# Patient Record
Sex: FEMALE | Race: WHITE | NOT HISPANIC OR LATINO
[De-identification: names, ages, dates, MRNs, and addresses within clinical notes are randomized per-mention and may not be internally consistent; named-entity substitution may affect disease eponyms.]

---

## 2018-02-23 ENCOUNTER — APPOINTMENT (OUTPATIENT)
Dept: RHEUMATOLOGY | Facility: CLINIC | Age: 63
End: 2018-02-23
Payer: COMMERCIAL

## 2018-02-23 VITALS
HEIGHT: 61 IN | DIASTOLIC BLOOD PRESSURE: 68 MMHG | OXYGEN SATURATION: 98 % | WEIGHT: 144 LBS | BODY MASS INDEX: 27.19 KG/M2 | SYSTOLIC BLOOD PRESSURE: 109 MMHG | HEART RATE: 64 BPM

## 2018-02-23 DIAGNOSIS — M35.9 SYSTEMIC INVOLVEMENT OF CONNECTIVE TISSUE, UNSPECIFIED: ICD-10-CM

## 2018-02-23 DIAGNOSIS — M32.9 SYSTEMIC LUPUS ERYTHEMATOSUS, UNSPECIFIED: ICD-10-CM

## 2018-02-23 DIAGNOSIS — R76.8 OTHER SPECIFIED ABNORMAL IMMUNOLOGICAL FINDINGS IN SERUM: ICD-10-CM

## 2018-02-23 PROCEDURE — 99204 OFFICE O/P NEW MOD 45 MIN: CPT

## 2018-02-23 RX ORDER — ASPIRIN ENTERIC COATED TABLETS 81 MG 81 MG/1
81 TABLET, DELAYED RELEASE ORAL
Refills: 0 | Status: ACTIVE | COMMUNITY

## 2018-02-23 RX ORDER — OMEPRAZOLE MAGNESIUM 20 MG/1
20 TABLET, DELAYED RELEASE ORAL
Refills: 0 | Status: ACTIVE | COMMUNITY

## 2018-02-23 RX ORDER — DILTIAZEM HYDROCHLORIDE 180 MG/1
180 TABLET, EXTENDED RELEASE ORAL
Refills: 0 | Status: ACTIVE | COMMUNITY

## 2018-02-23 RX ORDER — METOPROLOL TARTRATE 50 MG/1
50 TABLET ORAL
Refills: 0 | Status: ACTIVE | COMMUNITY

## 2018-02-28 ENCOUNTER — LABORATORY RESULT (OUTPATIENT)
Age: 63
End: 2018-02-28

## 2018-03-05 ENCOUNTER — RX RENEWAL (OUTPATIENT)
Age: 63
End: 2018-03-05

## 2018-03-05 LAB
ALBUMIN MFR SERPL ELPH: 56.2 %
ALBUMIN SERPL ELPH-MCNC: 4.1 G/DL
ALBUMIN SERPL-MCNC: 4 G/DL
ALBUMIN/GLOB SERPL: 1.2 RATIO
ALP BLD-CCNC: 91 U/L
ALPHA1 GLOB MFR SERPL ELPH: 4 %
ALPHA1 GLOB SERPL ELPH-MCNC: 0.3 G/DL
ALPHA2 GLOB MFR SERPL ELPH: 9.2 %
ALPHA2 GLOB SERPL ELPH-MCNC: 0.7 G/DL
ALT SERPL-CCNC: 17 U/L
ANA PAT FLD IF-IMP: ABNORMAL
ANA SER IF-ACNC: ABNORMAL
ANION GAP SERPL CALC-SCNC: 15 MMOL/L
APPEARANCE: CLEAR
AST SERPL-CCNC: 18 U/L
B-GLOBULIN MFR SERPL ELPH: 10.4 %
B-GLOBULIN SERPL ELPH-MCNC: 0.7 G/DL
B2 GLYCOPROT1 IGG SER-ACNC: >150 SGU
B2 GLYCOPROT1 IGM SER-ACNC: >150 SMU
BACTERIA: NEGATIVE
BASOPHILS # BLD AUTO: 0.02 K/UL
BASOPHILS NFR BLD AUTO: 0.5 %
BILIRUB SERPL-MCNC: 0.5 MG/DL
BILIRUBIN URINE: NEGATIVE
BLOOD URINE: NEGATIVE
BUN SERPL-MCNC: 20 MG/DL
CALCIUM SERPL-MCNC: 9.7 MG/DL
CARDIOLIPIN IGM SER-MCNC: 10.9 MPL
CARDIOLIPIN IGM SER-MCNC: <5 GPL
CCP AB SER IA-ACNC: <8 UNITS
CHLORIDE SERPL-SCNC: 105 MMOL/L
CO2 SERPL-SCNC: 23 MMOL/L
COLOR: YELLOW
CREAT SERPL-MCNC: 0.84 MG/DL
CREAT SPEC-SCNC: 101 MG/DL
CREAT/PROT UR: 0.1 RATIO
CRP SERPL-MCNC: <0.2 MG/DL
DSDNA AB SER-ACNC: <12 IU/ML
ENA JO1 AB SER IA-ACNC: <0.2 AL
ENA RNP AB SER IA-ACNC: <0.2 AL
ENA SM AB SER IA-ACNC: <0.2 AL
ENA SS-A AB SER IA-ACNC: 3.8 AL
ENA SS-B AB SER IA-ACNC: <0.2 AL
EOSINOPHIL # BLD AUTO: 0.06 K/UL
EOSINOPHIL NFR BLD AUTO: 1.5 %
ERYTHROCYTE [SEDIMENTATION RATE] IN BLOOD BY WESTERGREN METHOD: 19 MM/HR
GAMMA GLOB FLD ELPH-MCNC: 1.5 G/DL
GAMMA GLOB MFR SERPL ELPH: 20.2 %
GLUCOSE QUALITATIVE U: NEGATIVE MG/DL
GLUCOSE SERPL-MCNC: 93 MG/DL
HCT VFR BLD CALC: 37.2 %
HGB BLD-MCNC: 11.9 G/DL
HYALINE CASTS: 2 /LPF
IMM GRANULOCYTES NFR BLD AUTO: 0.2 %
INTERPRETATION SERPL IEP-IMP: NORMAL
KETONES URINE: NEGATIVE
LEUKOCYTE ESTERASE URINE: NEGATIVE
LYMPHOCYTES # BLD AUTO: 1.26 K/UL
LYMPHOCYTES NFR BLD AUTO: 30.5 %
MAN DIFF?: NORMAL
MCHC RBC-ENTMCNC: 30.6 PG
MCHC RBC-ENTMCNC: 32 GM/DL
MCV RBC AUTO: 95.6 FL
MICROSCOPIC-UA: NORMAL
MONOCYTES # BLD AUTO: 0.71 K/UL
MONOCYTES NFR BLD AUTO: 17.2 %
NEUTROPHILS # BLD AUTO: 2.07 K/UL
NEUTROPHILS NFR BLD AUTO: 50.1 %
NITRITE URINE: NEGATIVE
PH URINE: 6
PLATELET # BLD AUTO: 236 K/UL
POTASSIUM SERPL-SCNC: 4.4 MMOL/L
PROT SERPL-MCNC: 7.2 G/DL
PROT SERPL-MCNC: 7.2 G/DL
PROT SERPL-MCNC: 7.3 G/DL
PROT UR-MCNC: 7 MG/DL
PROTEIN URINE: NEGATIVE MG/DL
RBC # BLD: 3.89 M/UL
RBC # FLD: 13.3 %
RED BLOOD CELLS URINE: 1 /HPF
RF+CCP IGG SER-IMP: NEGATIVE
RHEUMATOID FACT SER QL: <7 IU/ML
SODIUM SERPL-SCNC: 143 MMOL/L
SPECIFIC GRAVITY URINE: 1.02
SQUAMOUS EPITHELIAL CELLS: 0 /HPF
THYROGLOB AB SERPL-ACNC: <20 IU/ML
THYROPEROXIDASE AB SERPL IA-ACNC: 22.6 IU/ML
UROBILINOGEN URINE: NEGATIVE MG/DL
WBC # FLD AUTO: 4.13 K/UL
WHITE BLOOD CELLS URINE: 1 /HPF

## 2018-03-29 LAB — HISTONE AB SER QL: 0.8

## 2018-05-17 ENCOUNTER — APPOINTMENT (OUTPATIENT)
Dept: CT IMAGING | Facility: CLINIC | Age: 63
End: 2018-05-17
Payer: COMMERCIAL

## 2018-05-17 ENCOUNTER — OUTPATIENT (OUTPATIENT)
Dept: OUTPATIENT SERVICES | Facility: HOSPITAL | Age: 63
LOS: 1 days | End: 2018-05-17
Payer: COMMERCIAL

## 2018-05-17 DIAGNOSIS — R06.02 SHORTNESS OF BREATH: ICD-10-CM

## 2018-05-17 PROCEDURE — 71250 CT THORAX DX C-: CPT

## 2018-05-17 PROCEDURE — 71250 CT THORAX DX C-: CPT | Mod: 26

## 2018-05-30 ENCOUNTER — APPOINTMENT (OUTPATIENT)
Dept: RHEUMATOLOGY | Facility: CLINIC | Age: 63
End: 2018-05-30

## 2018-09-05 ENCOUNTER — FORM ENCOUNTER (OUTPATIENT)
Age: 63
End: 2018-09-05

## 2018-09-06 ENCOUNTER — APPOINTMENT (OUTPATIENT)
Dept: RADIOLOGY | Facility: CLINIC | Age: 63
End: 2018-09-06
Payer: COMMERCIAL

## 2018-09-06 ENCOUNTER — OUTPATIENT (OUTPATIENT)
Dept: OUTPATIENT SERVICES | Facility: HOSPITAL | Age: 63
LOS: 1 days | End: 2018-09-06
Payer: COMMERCIAL

## 2018-09-06 DIAGNOSIS — Z00.8 ENCOUNTER FOR OTHER GENERAL EXAMINATION: ICD-10-CM

## 2018-09-06 PROCEDURE — 71100 X-RAY EXAM RIBS UNI 2 VIEWS: CPT

## 2018-09-06 PROCEDURE — 71100 X-RAY EXAM RIBS UNI 2 VIEWS: CPT | Mod: 26,RT

## 2018-10-02 ENCOUNTER — APPOINTMENT (OUTPATIENT)
Dept: BREAST CENTER | Facility: CLINIC | Age: 63
End: 2018-10-02
Payer: COMMERCIAL

## 2018-10-02 VITALS
DIASTOLIC BLOOD PRESSURE: 72 MMHG | WEIGHT: 146 LBS | HEIGHT: 61 IN | BODY MASS INDEX: 27.56 KG/M2 | SYSTOLIC BLOOD PRESSURE: 124 MMHG

## 2018-10-02 DIAGNOSIS — Z82.3 FAMILY HISTORY OF STROKE: ICD-10-CM

## 2018-10-02 DIAGNOSIS — Z87.39 PERSONAL HISTORY OF OTHER DISEASES OF THE MUSCULOSKELETAL SYSTEM AND CONNECTIVE TISSUE: ICD-10-CM

## 2018-10-02 DIAGNOSIS — Z82.49 FAMILY HISTORY OF ISCHEMIC HEART DISEASE AND OTHER DISEASES OF THE CIRCULATORY SYSTEM: ICD-10-CM

## 2018-10-02 DIAGNOSIS — Z80.0 FAMILY HISTORY OF MALIGNANT NEOPLASM OF DIGESTIVE ORGANS: ICD-10-CM

## 2018-10-02 DIAGNOSIS — Z87.891 PERSONAL HISTORY OF NICOTINE DEPENDENCE: ICD-10-CM

## 2018-10-02 DIAGNOSIS — Z81.8 FAMILY HISTORY OF OTHER MENTAL AND BEHAVIORAL DISORDERS: ICD-10-CM

## 2018-10-02 DIAGNOSIS — Z84.0 FAMILY HISTORY OF DISEASES OF THE SKIN AND SUBCUTANEOUS TISSUE: ICD-10-CM

## 2018-10-02 DIAGNOSIS — N64.89 OTHER SPECIFIED DISORDERS OF BREAST: ICD-10-CM

## 2018-10-02 DIAGNOSIS — R92.8 OTHER ABNORMAL AND INCONCLUSIVE FINDINGS ON DIAGNOSTIC IMAGING OF BREAST: ICD-10-CM

## 2018-10-02 PROCEDURE — 99243 OFF/OP CNSLTJ NEW/EST LOW 30: CPT

## 2018-12-24 PROBLEM — M32.9 LUPUS: Status: ACTIVE | Noted: 2018-02-23

## 2019-03-05 ENCOUNTER — APPOINTMENT (OUTPATIENT)
Dept: RADIOLOGY | Facility: CLINIC | Age: 64
End: 2019-03-05
Payer: COMMERCIAL

## 2019-03-05 ENCOUNTER — OUTPATIENT (OUTPATIENT)
Dept: OUTPATIENT SERVICES | Facility: HOSPITAL | Age: 64
LOS: 1 days | End: 2019-03-05
Payer: COMMERCIAL

## 2019-03-05 DIAGNOSIS — Z00.8 ENCOUNTER FOR OTHER GENERAL EXAMINATION: ICD-10-CM

## 2019-03-05 PROCEDURE — 77080 DXA BONE DENSITY AXIAL: CPT

## 2019-03-05 PROCEDURE — 77080 DXA BONE DENSITY AXIAL: CPT | Mod: 26

## 2020-03-11 ENCOUNTER — APPOINTMENT (OUTPATIENT)
Dept: ELECTROPHYSIOLOGY | Facility: CLINIC | Age: 65
End: 2020-03-11

## 2020-04-25 ENCOUNTER — MESSAGE (OUTPATIENT)
Age: 65
End: 2020-04-25

## 2020-05-05 ENCOUNTER — APPOINTMENT (OUTPATIENT)
Dept: DISASTER EMERGENCY | Facility: CLINIC | Age: 65
End: 2020-05-05

## 2020-05-06 LAB
SARS-COV-2 IGG SERPL IA-ACNC: <0.1 INDEX
SARS-COV-2 IGG SERPL QL IA: NEGATIVE

## 2020-05-18 ENCOUNTER — RESULT REVIEW (OUTPATIENT)
Age: 65
End: 2020-05-18

## 2020-06-03 RX ORDER — CYCLOBENZAPRINE HYDROCHLORIDE 10 MG/1
10 TABLET, FILM COATED ORAL
Refills: 0 | Status: DISCONTINUED | COMMUNITY
End: 2020-06-03

## 2020-06-03 RX ORDER — SERTRALINE HYDROCHLORIDE 100 MG/1
100 TABLET, FILM COATED ORAL
Refills: 0 | Status: DISCONTINUED | COMMUNITY
End: 2020-06-03

## 2020-06-08 ENCOUNTER — APPOINTMENT (OUTPATIENT)
Dept: SURGICAL ONCOLOGY | Facility: CLINIC | Age: 65
End: 2020-06-08
Payer: COMMERCIAL

## 2020-06-08 VITALS
WEIGHT: 138 LBS | SYSTOLIC BLOOD PRESSURE: 149 MMHG | BODY MASS INDEX: 26.06 KG/M2 | RESPIRATION RATE: 15 BRPM | HEIGHT: 61 IN | OXYGEN SATURATION: 100 % | TEMPERATURE: 98.1 F | HEART RATE: 56 BPM | DIASTOLIC BLOOD PRESSURE: 76 MMHG

## 2020-06-08 PROCEDURE — 99204 OFFICE O/P NEW MOD 45 MIN: CPT

## 2020-06-10 ENCOUNTER — OUTPATIENT (OUTPATIENT)
Dept: OUTPATIENT SERVICES | Facility: HOSPITAL | Age: 65
LOS: 1 days | End: 2020-06-10
Payer: COMMERCIAL

## 2020-06-10 ENCOUNTER — APPOINTMENT (OUTPATIENT)
Dept: RADIOLOGY | Facility: CLINIC | Age: 65
End: 2020-06-10
Payer: COMMERCIAL

## 2020-06-10 DIAGNOSIS — D03.39 MELANOMA IN SITU OF OTHER PARTS OF FACE: ICD-10-CM

## 2020-06-10 PROCEDURE — 71046 X-RAY EXAM CHEST 2 VIEWS: CPT

## 2020-06-10 PROCEDURE — 71046 X-RAY EXAM CHEST 2 VIEWS: CPT | Mod: 26

## 2020-06-16 ENCOUNTER — RESULT REVIEW (OUTPATIENT)
Age: 65
End: 2020-06-16

## 2020-07-07 DIAGNOSIS — Z01.818 ENCOUNTER FOR OTHER PREPROCEDURAL EXAMINATION: ICD-10-CM

## 2020-07-09 ENCOUNTER — APPOINTMENT (OUTPATIENT)
Dept: PLASTIC SURGERY | Facility: CLINIC | Age: 65
End: 2020-07-09
Payer: COMMERCIAL

## 2020-07-09 PROCEDURE — 99204 OFFICE O/P NEW MOD 45 MIN: CPT

## 2020-07-09 NOTE — CONSULT LETTER
[Dear  ___] : Dear  [unfilled], [Consult Letter:] : I had the pleasure of evaluating your patient, [unfilled]. [Please see my note below.] : Please see my note below. [Consult Closing:] : Thank you very much for allowing me to participate in the care of this patient.  If you have any questions, please do not hesitate to contact me. [Sincerely,] : Sincerely, [FreeTextEntry3] : Arun Bunn MD

## 2020-07-17 RX ORDER — SODIUM CHLORIDE 9 MG/ML
3 INJECTION INTRAMUSCULAR; INTRAVENOUS; SUBCUTANEOUS EVERY 8 HOURS
Refills: 0 | Status: DISCONTINUED | OUTPATIENT
Start: 2020-07-21 | End: 2020-08-05

## 2020-07-17 RX ORDER — LIDOCAINE HCL 20 MG/ML
0.2 VIAL (ML) INJECTION ONCE
Refills: 0 | Status: DISCONTINUED | OUTPATIENT
Start: 2020-07-21 | End: 2020-08-05

## 2020-07-18 ENCOUNTER — APPOINTMENT (OUTPATIENT)
Dept: DISASTER EMERGENCY | Facility: CLINIC | Age: 65
End: 2020-07-18

## 2020-07-18 LAB — SARS-COV-2 N GENE NPH QL NAA+PROBE: NOT DETECTED

## 2020-07-20 ENCOUNTER — TRANSCRIPTION ENCOUNTER (OUTPATIENT)
Age: 65
End: 2020-07-20

## 2020-07-20 NOTE — ASU DISCHARGE PLAN (ADULT/PEDIATRIC) - FOLLOW UP APPOINTMENTS
[facility] ([facility]): 911 or go to the nearest Emergency Room

## 2020-07-20 NOTE — ASU DISCHARGE PLAN (ADULT/PEDIATRIC) - CARE PROVIDER_API CALL
Aurn Bunn (MD)  ColonRectal Surgery; Plastic Surgery; Surgery; Surgery of the Hand  [address] [place] [address]  Phone: [phone]  Fax: [phone]  Follow Up Time: 1 week

## 2020-07-20 NOTE — ASU DISCHARGE PLAN (ADULT/PEDIATRIC) - CALL YOUR DOCTOR IF YOU HAVE ANY OF THE FOLLOWING:
Inability to tolerate liquids or foods/Bleeding that does not stop/Swelling that gets worse/Wound/Surgical Site with redness, or foul smelling discharge or pus/Excessive diarrhea/Pain not relieved by Medications/Numbness, tingling, color or temperature change to extremity/Unable to urinate/Nausea and vomiting that does not stop/Increased irritability or sluggishness

## 2020-07-20 NOTE — ASU DISCHARGE PLAN (ADULT/PEDIATRIC) - ASU DC SPECIAL INSTRUCTIONSFT
Initial followup with plastic surgery in the next 5-15 days, regarding matters of bandages, activities, and showering.    Dr. Hernandez should call with pathology report in approximately 2 weeks.  The conversation will determine further management.

## 2020-07-21 ENCOUNTER — APPOINTMENT (OUTPATIENT)
Dept: SURGICAL ONCOLOGY | Facility: HOSPITAL | Age: 65
End: 2020-07-21

## 2020-07-21 ENCOUNTER — OUTPATIENT (OUTPATIENT)
Dept: OUTPATIENT SERVICES | Facility: HOSPITAL | Age: 65
LOS: 1 days | End: 2020-07-21
Payer: COMMERCIAL

## 2020-07-21 ENCOUNTER — RESULT REVIEW (OUTPATIENT)
Age: 65
End: 2020-07-21

## 2020-07-21 ENCOUNTER — APPOINTMENT (OUTPATIENT)
Dept: PLASTIC SURGERY | Facility: HOSPITAL | Age: 65
End: 2020-07-21

## 2020-07-21 VITALS
OXYGEN SATURATION: 98 % | HEART RATE: 61 BPM | TEMPERATURE: 97 F | HEIGHT: 61 IN | WEIGHT: 138.89 LBS | DIASTOLIC BLOOD PRESSURE: 78 MMHG | RESPIRATION RATE: 16 BRPM | SYSTOLIC BLOOD PRESSURE: 131 MMHG

## 2020-07-21 VITALS
HEART RATE: 65 BPM | TEMPERATURE: 97 F | RESPIRATION RATE: 18 BRPM | DIASTOLIC BLOOD PRESSURE: 61 MMHG | OXYGEN SATURATION: 99 % | SYSTOLIC BLOOD PRESSURE: 129 MMHG

## 2020-07-21 DIAGNOSIS — Z90.49 ACQUIRED ABSENCE OF OTHER SPECIFIED PARTS OF DIGESTIVE TRACT: Chronic | ICD-10-CM

## 2020-07-21 DIAGNOSIS — Z98.890 OTHER SPECIFIED POSTPROCEDURAL STATES: Chronic | ICD-10-CM

## 2020-07-21 DIAGNOSIS — D03.39 MELANOMA IN SITU OF OTHER PARTS OF FACE: ICD-10-CM

## 2020-07-21 PROBLEM — Z87.19 PERSONAL HISTORY OF OTHER DISEASES OF THE DIGESTIVE SYSTEM: Chronic | Status: ACTIVE | Noted: 2020-07-17

## 2020-07-21 PROBLEM — Z87.39 PERSONAL HISTORY OF OTHER DISEASES OF THE MUSCULOSKELETAL SYSTEM AND CONNECTIVE TISSUE: Chronic | Status: ACTIVE | Noted: 2020-07-17

## 2020-07-21 PROBLEM — I47.1 SUPRAVENTRICULAR TACHYCARDIA: Chronic | Status: ACTIVE | Noted: 2020-07-17

## 2020-07-21 PROBLEM — Z86.79 PERSONAL HISTORY OF OTHER DISEASES OF THE CIRCULATORY SYSTEM: Chronic | Status: ACTIVE | Noted: 2020-07-17

## 2020-07-21 PROBLEM — Z87.09 PERSONAL HISTORY OF OTHER DISEASES OF THE RESPIRATORY SYSTEM: Chronic | Status: ACTIVE | Noted: 2020-07-17

## 2020-07-21 PROBLEM — R76.8 OTHER SPECIFIED ABNORMAL IMMUNOLOGICAL FINDINGS IN SERUM: Chronic | Status: ACTIVE | Noted: 2020-07-17

## 2020-07-21 PROBLEM — M35.00 SJOGREN SYNDROME, UNSPECIFIED: Chronic | Status: ACTIVE | Noted: 2020-07-17

## 2020-07-21 PROBLEM — C30.0 MALIGNANT NEOPLASM OF NASAL CAVITY: Chronic | Status: ACTIVE | Noted: 2020-07-17

## 2020-07-21 PROBLEM — Z86.19 PERSONAL HISTORY OF OTHER INFECTIOUS AND PARASITIC DISEASES: Chronic | Status: ACTIVE | Noted: 2020-07-17

## 2020-07-21 LAB
ANION GAP SERPL CALC-SCNC: 10 MMOL/L — SIGNIFICANT CHANGE UP (ref 5–17)
BUN SERPL-MCNC: 20 MG/DL — SIGNIFICANT CHANGE UP (ref 7–23)
CALCIUM SERPL-MCNC: 9.8 MG/DL — SIGNIFICANT CHANGE UP (ref 8.4–10.5)
CHLORIDE SERPL-SCNC: 105 MMOL/L — SIGNIFICANT CHANGE UP (ref 96–108)
CO2 SERPL-SCNC: 25 MMOL/L — SIGNIFICANT CHANGE UP (ref 22–31)
CREAT SERPL-MCNC: 0.68 MG/DL — SIGNIFICANT CHANGE UP (ref 0.5–1.3)
GLUCOSE SERPL-MCNC: 98 MG/DL — SIGNIFICANT CHANGE UP (ref 70–99)
HCT VFR BLD CALC: 37.3 % — SIGNIFICANT CHANGE UP (ref 34.5–45)
HGB BLD-MCNC: 12.2 G/DL — SIGNIFICANT CHANGE UP (ref 11.5–15.5)
LDH SERPL L TO P-CCNC: 150 U/L — SIGNIFICANT CHANGE UP (ref 50–242)
MCHC RBC-ENTMCNC: 30.7 PG — SIGNIFICANT CHANGE UP (ref 27–34)
MCHC RBC-ENTMCNC: 32.7 GM/DL — SIGNIFICANT CHANGE UP (ref 32–36)
MCV RBC AUTO: 94 FL — SIGNIFICANT CHANGE UP (ref 80–100)
NRBC # BLD: 0 /100 WBCS — SIGNIFICANT CHANGE UP (ref 0–0)
PLATELET # BLD AUTO: 215 K/UL — SIGNIFICANT CHANGE UP (ref 150–400)
POTASSIUM SERPL-MCNC: 3.8 MMOL/L — SIGNIFICANT CHANGE UP (ref 3.5–5.3)
POTASSIUM SERPL-SCNC: 3.8 MMOL/L — SIGNIFICANT CHANGE UP (ref 3.5–5.3)
RBC # BLD: 3.97 M/UL — SIGNIFICANT CHANGE UP (ref 3.8–5.2)
RBC # FLD: 12.7 % — SIGNIFICANT CHANGE UP (ref 10.3–14.5)
SODIUM SERPL-SCNC: 140 MMOL/L — SIGNIFICANT CHANGE UP (ref 135–145)
WBC # BLD: 2.93 K/UL — LOW (ref 3.8–10.5)
WBC # FLD AUTO: 2.93 K/UL — LOW (ref 3.8–10.5)

## 2020-07-21 PROCEDURE — 83615 LACTATE (LD) (LDH) ENZYME: CPT

## 2020-07-21 PROCEDURE — 15260 FTH/GFT FR N/E/E/L 20 SQCM/<: CPT

## 2020-07-21 PROCEDURE — 80048 BASIC METABOLIC PNL TOTAL CA: CPT

## 2020-07-21 PROCEDURE — 88305 TISSUE EXAM BY PATHOLOGIST: CPT | Mod: 26

## 2020-07-21 PROCEDURE — 11643 EXC F/E/E/N/L MAL+MRG 2.1-3: CPT

## 2020-07-21 PROCEDURE — 14061 TIS TRNFR E/N/E/L10.1-30SQCM: CPT

## 2020-07-21 PROCEDURE — 85027 COMPLETE CBC AUTOMATED: CPT

## 2020-07-21 PROCEDURE — 88305 TISSUE EXAM BY PATHOLOGIST: CPT

## 2020-07-21 PROCEDURE — 15750 NEUROVASCULAR PEDICLE FLAP: CPT

## 2020-07-21 RX ORDER — ONDANSETRON 8 MG/1
4 TABLET, FILM COATED ORAL ONCE
Refills: 0 | Status: DISCONTINUED | OUTPATIENT
Start: 2020-07-21 | End: 2020-08-05

## 2020-07-21 RX ORDER — ACETAMINOPHEN 500 MG
1000 TABLET ORAL ONCE
Refills: 0 | Status: DISCONTINUED | OUTPATIENT
Start: 2020-07-21 | End: 2020-08-05

## 2020-07-21 RX ORDER — OXYCODONE HYDROCHLORIDE 5 MG/1
1 TABLET ORAL
Qty: 8 | Refills: 0
Start: 2020-07-21 | End: 2020-07-25

## 2020-07-21 NOTE — BRIEF OPERATIVE NOTE - NSICDXBRIEFPROCEDURE_GEN_ALL_CORE_FT
PROCEDURES:  Full-thickness skin graft to nose 21-Jul-2020 10:27:51  Abbe Beyer  Reconstruction, nose, using nasolabial flap 21-Jul-2020 10:27:40  Abbe Beyer

## 2020-07-21 NOTE — BRIEF OPERATIVE NOTE - OPERATION/FINDINGS
L nasolabial flap for repair of L ala and skin graft to superior defect from distal tip of the nasolabial flap. Bolster applied.

## 2020-07-21 NOTE — PRE-ANESTHESIA EVALUATION ADULT - NSANTHOSAYNRD_GEN_A_CORE
No. DANNIELLE screening performed.  STOP BANG Legend: 0-2 = LOW Risk; 3-4 = INTERMEDIATE Risk; 5-8 = HIGH Risk

## 2020-07-21 NOTE — ASU PATIENT PROFILE, ADULT - PMH
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

## 2020-07-21 NOTE — ASU PATIENT PROFILE, ADULT - ABILITY TO HEAR (WITH HEARING AID OR HEARING APPLIANCE IF NORMALLY USED):
Adequate: hears normal conversation without difficulty Mildly to Moderately Impaired: difficulty hearing in some environments or speaker may need to increase volume or speak distinctly/lip reads

## 2020-07-21 NOTE — PRE-ANESTHESIA EVALUATION ADULT - NSANTHINDVINFOSD_GEN_ALL_CORE
patient O-Z Plasty Text: The defect edges were debeveled with a #15 scalpel blade.  Given the location of the defect, shape of the defect and the proximity to free margins an O-Z plasty (double transposition flap) was deemed most appropriate.  Using a sterile surgical marker, the appropriate transposition flaps were drawn incorporating the defect and placing the expected incisions within the relaxed skin tension lines where possible.    The area thus outlined was incised deep to adipose tissue with a #15 scalpel blade.  The skin margins were undermined to an appropriate distance in all directions utilizing iris scissors.  Hemostasis was achieved with electrocautery.  The flaps were then transposed into place, one clockwise and the other counterclockwise, and anchored with interrupted buried subcutaneous sutures.

## 2020-07-21 NOTE — ASU PATIENT PROFILE, ADULT - PSH
History of biopsy  muscle  History of cardiac radiofrequency ablation  SVT 10 yr ago  History of cholecystectomy    History of loop recorder    History of melanoma excision  chest  14 yr ago

## 2020-07-28 ENCOUNTER — APPOINTMENT (OUTPATIENT)
Dept: PLASTIC SURGERY | Facility: CLINIC | Age: 65
End: 2020-07-28
Payer: COMMERCIAL

## 2020-07-28 VITALS
OXYGEN SATURATION: 100 % | TEMPERATURE: 98.4 F | HEIGHT: 61 IN | SYSTOLIC BLOOD PRESSURE: 128 MMHG | WEIGHT: 139 LBS | HEART RATE: 65 BPM | BODY MASS INDEX: 26.24 KG/M2 | DIASTOLIC BLOOD PRESSURE: 76 MMHG

## 2020-07-28 PROCEDURE — 99024 POSTOP FOLLOW-UP VISIT: CPT

## 2020-07-29 LAB — SURGICAL PATHOLOGY STUDY: SIGNIFICANT CHANGE UP

## 2020-07-31 ENCOUNTER — TRANSCRIPTION ENCOUNTER (OUTPATIENT)
Age: 65
End: 2020-07-31

## 2020-07-31 NOTE — REASON FOR VISIT
[Initial Consultation] : an initial consultation for [Other: _____] : [unfilled] [FreeTextEntry2] :  Left nose  Melanoma in situ- shave biopsy

## 2020-07-31 NOTE — ASSESSMENT
[FreeTextEntry1] : We had a discussion regarding the recently diagnosed melanoma in situ of the left side of the nose.\par \par I explained the indications and technique for appropriate resection, which will be coordinated with plastic surgery.\par Using a photograph it demonstrated the scope of the lesion, and he anticipated resection to incorporate the pigmentation, at the same time taking into consideration issues with form/function\par She agrees, paperwork submitted\par \par She should have a preoperative chest x-ray.\par She agrees, paperwork submitted\par \par Reviewed in detail, all questions answered.\par \par Note dictated\par \par \par 06-10-20.\par Preoperative chest x-ray:\par My lung hyperextension, and presence of loop recorder noted.\par No other findings.\par \par \par 07-31-20.\par I called her but had to leave a voicemail.\par July 21, 2020, she had wide excision of a large diameter melanoma in situ from the left side of her nose, with reconstruction by Dr. Arun Bunn.\par Final pathology indicates no invasive component, residual in situ melanoma, and negative margins of resection.\par No further intervention presently warranted.\par Note dictated to the referring physicians.\par My office will call to schedule a follow-up visit.

## 2020-07-31 NOTE — PHYSICAL EXAM
[Normal] : supple, no neck mass and thyroid not enlarged [Normal Neck Lymph Nodes] : normal neck lymph nodes  [Normal Supraclavicular Lymph Nodes] : normal supraclavicular lymph nodes [Normal Axillary Lymph Nodes] : normal axillary lymph nodes [Normal] : full range of motion and no deformities appreciated [de-identified] : groins not examined [de-identified] : Below

## 2020-07-31 NOTE — HISTORY OF PRESENT ILLNESS
[de-identified] : 65-year-old lady referred by her dermatologist (Dr. Baylee TOPETE) with a melanoma in situ on the LEFT-SIDE oh her NOSE.\par \par This was an asymptomatic pigmented lesion that she was aware of since ~January 2020.\par Because the attention of her dermatologist (above) on a recent examination.\par \par Concomitant biopsies from the left thigh and mid back were seborrheic keratosis, and a compound lentiginous melanocytic nevus, respectively.\par \par + Personal history:\par ~2007 she had wide excision of a 0.3 mm melanoma by JR.\par No other personal history of skin cancer.\par \par No other personal history of malignancy.\par \par \par No relatives with melanoma.\par \par Her father had colon cancer.\par Mother had lupus.\par \par No other relatives with history of malignancy\par \par \par PMD:\par Dr. Winston FOWLER\par \par Cardiology: Dr. Ankush VASQUEZ.\par + SVT.\par + LOOP RECORDER, battery change pending\par \par PMH/Current medications:\par \par Atrial fibrillation.\par Baby aspirin daily.\par \par Blood pressure is controlled with Lopressor and Cardizem.\par \par + Chronic pulmonary problems.\par She sees Dr. Andrew WEIL intermittently.\par \par History of gastritis\par She takes Prilosec OTC.\par \par + Fibromyalgia\par + Lyme disease\par Rheumatology: Dr. Vijaya CADET\par \par She was started on Zoloft by Dr. Toño Garay.\par This was when she developed the above chronic medical problems.\par The prescription is now renewed by her internist.\par \par Most recent eye examination: 2018.\par She does not recall the physician's name.\par I advised a followup visit.\par \par She has not seen a gynecologist since 2015.\par She does not recall the name of the physician.\par I suggested a followup visit.\par \par She has breast examinations with Dr. Theresa Santana.\par Last imaging was 2018, she is aware that she is (over)due\par \par Colonoscopy:\par + FH: Father had colon cancer.\par Goes to Dr. Theodore Perlman.\par 2019 evaluation was unremarkable.

## 2020-07-31 NOTE — REVIEW OF SYSTEMS
[Negative] : Endocrine [FreeTextEntry5] : atrial fibrillation [FreeTextEntry7] : Lyme disease [de-identified] : Fibromyalgia [de-identified] : Melanoma

## 2020-08-04 ENCOUNTER — APPOINTMENT (OUTPATIENT)
Dept: PLASTIC SURGERY | Facility: CLINIC | Age: 65
End: 2020-08-04
Payer: COMMERCIAL

## 2020-08-04 VITALS
HEIGHT: 61 IN | SYSTOLIC BLOOD PRESSURE: 121 MMHG | BODY MASS INDEX: 26.24 KG/M2 | OXYGEN SATURATION: 99 % | WEIGHT: 139 LBS | TEMPERATURE: 97.6 F | HEART RATE: 55 BPM | DIASTOLIC BLOOD PRESSURE: 74 MMHG

## 2020-08-04 PROCEDURE — 99024 POSTOP FOLLOW-UP VISIT: CPT

## 2020-08-07 ENCOUNTER — APPOINTMENT (OUTPATIENT)
Dept: PLASTIC SURGERY | Facility: CLINIC | Age: 65
End: 2020-08-07
Payer: COMMERCIAL

## 2020-08-07 PROCEDURE — 99024 POSTOP FOLLOW-UP VISIT: CPT

## 2020-08-14 ENCOUNTER — APPOINTMENT (OUTPATIENT)
Dept: DISASTER EMERGENCY | Facility: CLINIC | Age: 65
End: 2020-08-14

## 2020-08-14 ENCOUNTER — LABORATORY RESULT (OUTPATIENT)
Age: 65
End: 2020-08-14

## 2020-08-14 DIAGNOSIS — Z11.59 ENCOUNTER FOR SCREENING FOR OTHER VIRAL DISEASES: ICD-10-CM

## 2020-08-17 ENCOUNTER — LABORATORY RESULT (OUTPATIENT)
Age: 65
End: 2020-08-17

## 2020-08-19 ENCOUNTER — TRANSCRIPTION ENCOUNTER (OUTPATIENT)
Age: 65
End: 2020-08-19

## 2020-08-20 ENCOUNTER — APPOINTMENT (OUTPATIENT)
Dept: PLASTIC SURGERY | Facility: HOSPITAL | Age: 65
End: 2020-08-20

## 2020-08-20 ENCOUNTER — OUTPATIENT (OUTPATIENT)
Dept: OUTPATIENT SERVICES | Facility: HOSPITAL | Age: 65
LOS: 1 days | Discharge: HOME | End: 2020-08-20
Payer: COMMERCIAL

## 2020-08-20 VITALS
HEART RATE: 58 BPM | HEIGHT: 61 IN | WEIGHT: 138.01 LBS | OXYGEN SATURATION: 100 % | TEMPERATURE: 98 F | RESPIRATION RATE: 16 BRPM | DIASTOLIC BLOOD PRESSURE: 65 MMHG | SYSTOLIC BLOOD PRESSURE: 118 MMHG

## 2020-08-20 VITALS
DIASTOLIC BLOOD PRESSURE: 59 MMHG | OXYGEN SATURATION: 99 % | RESPIRATION RATE: 15 BRPM | HEART RATE: 65 BPM | SYSTOLIC BLOOD PRESSURE: 125 MMHG

## 2020-08-20 DIAGNOSIS — Z98.890 OTHER SPECIFIED POSTPROCEDURAL STATES: Chronic | ICD-10-CM

## 2020-08-20 DIAGNOSIS — D03.39 MELANOMA IN SITU OF OTHER PARTS OF FACE: ICD-10-CM

## 2020-08-20 DIAGNOSIS — Z90.49 ACQUIRED ABSENCE OF OTHER SPECIFIED PARTS OF DIGESTIVE TRACT: Chronic | ICD-10-CM

## 2020-08-20 PROCEDURE — 15630 DELAY FLAP EYE/NOS/EAR/LIP: CPT | Mod: 58

## 2020-08-20 RX ORDER — ONDANSETRON 8 MG/1
4 TABLET, FILM COATED ORAL ONCE
Refills: 0 | Status: DISCONTINUED | OUTPATIENT
Start: 2020-08-20 | End: 2020-09-04

## 2020-08-20 RX ORDER — ACETAMINOPHEN 500 MG
650 TABLET ORAL ONCE
Refills: 0 | Status: DISCONTINUED | OUTPATIENT
Start: 2020-08-20 | End: 2020-09-04

## 2020-08-20 RX ORDER — FENTANYL CITRATE 50 UG/ML
50 INJECTION INTRAVENOUS
Refills: 0 | Status: DISCONTINUED | OUTPATIENT
Start: 2020-08-20 | End: 2020-08-20

## 2020-08-20 RX ORDER — SODIUM CHLORIDE 9 MG/ML
1000 INJECTION, SOLUTION INTRAVENOUS
Refills: 0 | Status: DISCONTINUED | OUTPATIENT
Start: 2020-08-20 | End: 2020-09-04

## 2020-08-20 RX ORDER — FENTANYL CITRATE 50 UG/ML
25 INJECTION INTRAVENOUS
Refills: 0 | Status: DISCONTINUED | OUTPATIENT
Start: 2020-08-20 | End: 2020-08-20

## 2020-08-20 NOTE — ASU DISCHARGE PLAN (ADULT/PEDIATRIC) - ACTIVITY LEVEL
No tub baths/No excercise/No sports/gym/No heavy lifting/keep the head elevated and avoid bending down. avoid sleeping flat on your back make sure to use 1-2 pillows to keep elevated. this will reduce the swelling

## 2020-08-20 NOTE — BRIEF OPERATIVE NOTE - NSICDXBRIEFPREOP_GEN_ALL_CORE_FT
PRE-OP DIAGNOSIS:  Melanoma of nose 20-Aug-2020 16:04:40  Galen Anne  History of melanoma 20-Aug-2020 16:04:27  Galen Anne

## 2020-08-20 NOTE — H&P PST ADULT - NSICDXPASTSURGICALHX_GEN_ALL_CORE_FT
PAST SURGICAL HISTORY:  History of biopsy muscle    History of cardiac radiofrequency ablation SVT 10 yr ago    History of cholecystectomy     History of loop recorder     History of melanoma excision chest  14 yr ago

## 2020-08-20 NOTE — H&P PST ADULT - NSICDXPASTMEDICALHX_GEN_ALL_CORE_FT
PAST MEDICAL HISTORY:  DAVID positive 1995 autoimmune disease,  CPK high    History of atrial fibrillation one episode, allergic reaction to Dilaudid , was treated with medications, denies recurence    History of diverticulosis     History of emphysema mild ( due to autoimmune diseae )    History of fibromyalgia     History of gastritis on Prilosec, stable    History of Lyme disease 1995    History of mitral valve prolapse mild    History of Sjogren's disease     Melanoma of nasal cavity left side, "less than a  dime"    SVT (supraventricular tachycardia) h/o ablation and loop recorder

## 2020-08-20 NOTE — H&P PST ADULT - HISTORY OF PRESENT ILLNESS
65 yr old female with history of autoimmune disease, fibromyalgia, SVT , loop recorder, s/p ablation, on Metoprolol and Cardizem , mild emphysema, gastritis on Prilosec, left nasal melanoma " less than dime " , underwent WLE and reconstructio of left nasal ALA wound with nasolabial flap on 7/21/20 presetning today for second stage reconstruction. Denies fever, chills, no acute complaints. Denies sick contacts. Ambulating without assistance.

## 2020-08-20 NOTE — ASU DISCHARGE PLAN (ADULT/PEDIATRIC) - NURSING INSTRUCTIONS
You were given intravenous TYLENOL for pain management at __1045___. Please DO NOT take any products containing TYLENOL or ACETAMINOPHEN, such as VICODIN, PERCOCET, EXCEDRIN, and any over-the-counter cold medication for the next 6 hours. You can take tylenol any time 4:45pm

## 2020-08-20 NOTE — BRIEF OPERATIVE NOTE - NSICDXBRIEFPOSTOP_GEN_ALL_CORE_FT
POST-OP DIAGNOSIS:  History of melanoma 20-Aug-2020 16:05:26  Galen Anne  Melanoma of nose 20-Aug-2020 16:05:19  Galen Anne

## 2020-08-20 NOTE — ASU DISCHARGE PLAN (ADULT/PEDIATRIC) - BATHING
please keep dry until next week's post op visit. you may wash with wash cloth around it./Do not submerge in water/Sponge only

## 2020-08-20 NOTE — ASU PATIENT PROFILE, ADULT - HAS THE PATIENT USED TOBACCO IN THE PAST 30 DAYS?
HPI:  82 yo female c/o abdominal pain, diffuse, since early afternoon.  Cramping in nature.   +nausea, vomited x 1.  No diarrhea.  Had  a BM this morning, soft but formed. Passing gas today.  Nonsmoker, nondrinker.  No previous abdominal surgeries.  On prednisone x 8 days for low cortisol levels.    ED attempted to reduce left inguinal hernia, however unsuccessful. Pt given ice packs, morphine and placed in trendelenberg position. Patient then stating that her pain is gone. Patients hernia then able to be reduced in ED.     S/P SBO secondary to Incarcerated Left Inguinal hernia , Left inguinal hernia repair.      Patient is a 81y old  Female who presents with a chief complaint of abdominal soreness s/p ex-lap for SBO and incarcerated hernia      Consulted by Dr. William for VTE prophylaxis, risk stratification, and anticoagulation management.    PAST MEDICAL & SURGICAL HISTORY:  HLD (hyperlipidemia)  HTN (hypertension)  Pacemaker  Atrial fibrillation  History of melanoma excision  H/O cataract extraction: both eyes  S/P tonsillectomy and adenoidectomy    BMI: 23.5    CrCl:76.6    CAPRINI SCORE  AGE RELATED RISK FACTORS                                                       MOBILITY RELATED FACTORS  [ ] Age 41-60 years                                            (1 Point)                  [ ] Bed rest                                                        (1 Point)  [ ] Age: 61-74 years                                           (2 Points)                [ ] Plaster cast                                                   (2 Points)  [ x] Age= 75 years                                              (3 Points)                 [ ] Bed bound for more than 72 hours                   (2 Points)    DISEASE RELATED RISK FACTORS                                               GENDER SPECIFIC FACTORS  [ ] Edema in the lower extremities                       (1 Point)           [ ] Pregnancy                                                            (1 Point)  [ ] Varicose veins                                               (1 Point)                  [ ] Post-partum < 6 weeks                                      (1 Point)             [ ] BMI > 25 Kg/m2                                            (1 Point)                  [ ] Hormonal therapy or oral contraception       (1 Point)                 [ ] Sepsis (in the previous month)                        (1 Point)             [ ] History of pregnancy complications                (1Point)  [ ] Pneumonia or serious lung disease                                             [ ] Unexplained or recurrent  (=/>3), premature                                 (In the previous month)                               (1 Point)                birth with toxemia or growth-restricted infant (1 Point)  [ ] Abnormal pulmonary function test            (1 Point)                                   SURGERY RELATED RISK FACTORS  [ ] Acute myocardial infarction                       (1 Point)                  [ ]  Section                                         (1 Point)  [ ] Congestive heart failure (in the previous month) (1 Point)   [ ] Minor surgery   lasting <45 minutes       (1 Point)   [ ] Inflammatory bowel disease                             (1 Point)          [ ] Arthroscopic surgery                                  (2 Points)  [ ] Central venous access                                    (2 Points)            [ x] General surgery lasting >45 minutes      (2 Points)       [ ] Stroke (in the previous month)                  (5 Points)            [ ] Elective major lower extremity arthroplasty (5 Points)                                   [  ] Malignancy (present or past include skin melanoma                                          but exclude  basal skin cell)    (2 points)                                      TRAUMA RELATED RISK FACTORS                HEMATOLOGY RELATED FACTORS                                  [ ] Fracture of the hip, pelvis, or leg                       (5 Points)  [ ] Prior episodes of VTE                                     (3 Points)          [ ] Acute spinal cord injury (in the previous month)  (5 Points)  [ ] Positive family history for VTE                         (3 Points)       [ ] Paralysis (less than 1 month)                          (5 Points)  [ ] Prothrombin 09025 A                                      (3 Points)         [ ] Multiple Trauma (within 1month)                 (5Points)                                                                                                                                                                [ ] Factor V Leiden                                          (3 Points)                                OTHER RISK FACTORS                          [ ] Lupus anticoagulants                                     (3 Points)                       [ ] BMI > 40                          (1 Point)                                                         [ ] Anticardiolipin antibodies                                (3 Points)                   [ ] Smoking                              (1Point)                                                [ ] High homocysteine in the blood                      (3 Points)                [  ] Diabetes requiring insulin (1point)                         [ ] Other congenital or acquired thrombophilia       (3 Points)          [  ] Chemotherapy                   (1 Point)  [ ] Heparin induced thrombocytopenia                  (3 Points)             [  ] Blood Transfusion                (1 point)                                                                                                             Total Score [  5  ]                                                                                                                                                                                                                                                                                                                                      [id number] Score: 6    IMPROVE Bleeding Risk Score: 1.5      Falls Risk:   High (x  )  Mod (  )  Low (  )    : Patient seen at bedside OOB to chair eating clear liquids and tolerating. Reports abdominal pain as "pinching pain" with movement. Denies any NVD, +flatus. Explained will resume eliquis this evening. She verbalizes agreement and understanding.  2020 Pt seen at bedside 3north with son.  Discussed her anticoagulation with Eliquis  No concerns  States she is starting on solid foods today.   FAMILY HISTORY:  No pertinent family history in first degree relatives    Denies any personal or familial history of clotting or bleeding disorders.    Allergies    penicillins (Hives)    Intolerances    REVIEW OF SYSTEMS    (  )Fever	     (  )Constipation	(  )SOB				(  )Headache	(  )Dysuria  (  )Chills	     (  )Melena	(  )Dyspnea present on exertion (  )Dizziness   (  )Polyuria  (  )Nausea	     (  )Hematochezia	(  )Cough         (  )Syncope   	         (  )Hematuria  (  )Vomiting    (  )Chest Pain	(  )Wheezing		(x) abdominal discomfort	(  )Weakness  (  )Diarrhea     (  )Palpitations	(  )Anorexia			(  )Myalgia    Pertinent positives in HPI and daily subjective. All other systems negative.    PHYSICAL EXAM:    Constitutional: Appears Well    Neurological: A& O x 3    Skin: Warm    Respiratory and Thorax: normal effort; Breath sounds: normal; No rales/wheezing/rhonchi  	  Cardiovascular: S1, S2, irregularly irregular, NMBR	    Gastrointestinal: BS + x 4Q,sluggish, tender LLQ	    Genitourinary:  Bladder nondistended, nontender    Musculoskeletal:   General Right:   no muscle/joint tenderness,   normal tone, no joint swelling,   ROM: full	    General Left:   no muscle/joint tenderness,   normal tone, no joint swelling,   ROM: full    Abd: LLQ with staple DELFINA    Lower extrems:   Right: no calf tenderness              negative richard's sign               + pedal pulses    Left:   no calf tenderness              negative richard's sign               + pedal pulses                           10.3   8.07  )-----------( 150      ( 2020 07:19 )             31.6       01-29    137  |  106  |  33<H>  ----------------------------<  90  4.0   |  26  |  0.54    Ca    8.1<L>      2020 07:19                             10.3   8.07  )-----------( 150      ( 2020 07:19 )             31.6       01-29    137  |  106  |  33<H>  ----------------------------<  90  4.0   |  26  |  0.54    Ca    8.1<L>      2020 07:19    TPro  5.5<L>  /  Alb  2.8<L>  /  TBili  0.9  /  DBili  x   /  AST  16  /  ALT  30  /  AlkPhos  62  01-28      PT/INR - ( 2020 16:32 )   PT: 11.7 sec;   INR: 1.05 ratio         PTT - ( 2020 14:19 )  PTT:32.0 sec				    MEDICATIONS  (STANDING):  apixaban 5 milliGRAM(s) Oral two times a day  metoprolol tartrate 50 milliGRAM(s) Oral two times a day  pantoprazole    Tablet 40 milliGRAM(s) Oral before breakfast  predniSONE   Tablet 20 milliGRAM(s) Oral two times a day        Vital Signs Last 24 Hrs  T(C): 36.7 (20 @ 11:10), Max: 36.7 (20 @ 11:10)  T(F): 98 (20 @ 11:10), Max: 98 (20 @ 11:10)  HR: 78 (20 @ 11:10) (69 - 84)  BP: 122/63 (20 @ 11:10) (121/54 - 130/67)  BP(mean): 71 (20 @ 05:05) (71 - 71)  RR: 18 (20 @ 11:10) (18 - 19)  SpO2: 100% (20 @ 11:10) (99% - 100%)      **Current DVT Prophylaxis:    LMWH                   (  )  Heparin SQ           (  )  Coumadin             (  )  Xarelto                  (  )  Eliquis                   ( x )  Venodynes           (x  )  Ambulation          (x  )  UFH                       (  )  ECASA                   (  )  Contraindicated  (  ) No

## 2020-08-20 NOTE — ASU DISCHARGE PLAN (ADULT/PEDIATRIC) - ASU DC SPECIAL INSTRUCTIONSFT
1. Please follow up with Dr. Bunn's PA, Ayaka, next week TUESDAY for your first post operative visit. Please call [phone] for an appointment time.    2. Please avoid any strenuous activities, AVOID bending, stretching, reaching overhead, or any heavy lifting.    3. Some OOZING and blood on the dressing is normal and to be expected. If it becomes saturated w/ BRIGHT red blood that does not stop, please call [phone] for email AYAKA: [email or web address]    4. Your medications were sent to your pharmacy.  Please take the prescribed medications as directed. Antibiotics were sent.  For MILD pain please take regular over the counter pain medications such as: Advil, Tylenol, Motrin. Please use ice pack to the area to reduce swelling. Make sure to sleep with 1-2 pillows to keep head elevated.

## 2020-08-20 NOTE — ASU DISCHARGE PLAN (ADULT/PEDIATRIC) - CALL YOUR DOCTOR IF YOU HAVE ANY OF THE FOLLOWING:
Swelling that gets worse/Pain not relieved by Medications/Fever greater than (need to indicate Fahrenheit or Celsius)/Wound/Surgical Site with redness, or foul smelling discharge or pus/Bleeding that does not stop/Numbness, tingling, color or temperature change to extremity/[phone]

## 2020-08-20 NOTE — BRIEF OPERATIVE NOTE - OPERATION/FINDINGS
PATIENT IS S/P WIDE EXCISION OF MELANOMA OF LEFT NASAL ALA  PATIENT HAD NASOLABIAL FOLD FLAP RECONSTRUCTION    TODAY UNDERWENT NASOLABIAL FOLD FLAP TAKEDOWN   THE SKIN WAS CLOSED WITH 4-0 VICRYL, 5-0 NYLON

## 2020-08-28 ENCOUNTER — APPOINTMENT (OUTPATIENT)
Dept: PLASTIC SURGERY | Facility: CLINIC | Age: 65
End: 2020-08-28
Payer: COMMERCIAL

## 2020-08-28 VITALS
TEMPERATURE: 98.3 F | SYSTOLIC BLOOD PRESSURE: 111 MMHG | BODY MASS INDEX: 26.24 KG/M2 | HEIGHT: 61 IN | HEART RATE: 62 BPM | OXYGEN SATURATION: 97 % | DIASTOLIC BLOOD PRESSURE: 68 MMHG | WEIGHT: 139 LBS

## 2020-08-28 PROCEDURE — 99024 POSTOP FOLLOW-UP VISIT: CPT

## 2020-09-08 ENCOUNTER — APPOINTMENT (OUTPATIENT)
Dept: PLASTIC SURGERY | Facility: CLINIC | Age: 65
End: 2020-09-08
Payer: COMMERCIAL

## 2020-09-08 VITALS
HEIGHT: 61 IN | SYSTOLIC BLOOD PRESSURE: 118 MMHG | TEMPERATURE: 98.2 F | HEART RATE: 60 BPM | BODY MASS INDEX: 25.68 KG/M2 | WEIGHT: 136 LBS | OXYGEN SATURATION: 96 % | DIASTOLIC BLOOD PRESSURE: 62 MMHG

## 2020-09-08 PROCEDURE — 99024 POSTOP FOLLOW-UP VISIT: CPT

## 2020-10-22 ENCOUNTER — APPOINTMENT (OUTPATIENT)
Dept: PLASTIC SURGERY | Facility: CLINIC | Age: 65
End: 2020-10-22
Payer: COMMERCIAL

## 2020-10-22 VITALS
HEIGHT: 61 IN | WEIGHT: 136 LBS | DIASTOLIC BLOOD PRESSURE: 65 MMHG | HEART RATE: 63 BPM | OXYGEN SATURATION: 99 % | BODY MASS INDEX: 25.68 KG/M2 | TEMPERATURE: 98.2 F | SYSTOLIC BLOOD PRESSURE: 127 MMHG

## 2020-10-22 PROCEDURE — 99024 POSTOP FOLLOW-UP VISIT: CPT

## 2020-11-19 ENCOUNTER — APPOINTMENT (OUTPATIENT)
Dept: PLASTIC SURGERY | Facility: CLINIC | Age: 65
End: 2020-11-19
Payer: COMMERCIAL

## 2020-11-19 VITALS
DIASTOLIC BLOOD PRESSURE: 71 MMHG | HEART RATE: 75 BPM | BODY MASS INDEX: 25.68 KG/M2 | HEIGHT: 61 IN | OXYGEN SATURATION: 98 % | WEIGHT: 136 LBS | SYSTOLIC BLOOD PRESSURE: 122 MMHG

## 2020-11-19 PROCEDURE — 99213 OFFICE O/P EST LOW 20 MIN: CPT | Mod: 25

## 2020-11-19 PROCEDURE — 99072 ADDL SUPL MATRL&STAF TM PHE: CPT

## 2021-01-14 ENCOUNTER — APPOINTMENT (OUTPATIENT)
Dept: PLASTIC SURGERY | Facility: CLINIC | Age: 66
End: 2021-01-14
Payer: COMMERCIAL

## 2021-01-14 VITALS
SYSTOLIC BLOOD PRESSURE: 113 MMHG | DIASTOLIC BLOOD PRESSURE: 69 MMHG | HEIGHT: 61 IN | BODY MASS INDEX: 25.68 KG/M2 | WEIGHT: 136 LBS | HEART RATE: 65 BPM | OXYGEN SATURATION: 98 % | TEMPERATURE: 97.7 F

## 2021-01-14 DIAGNOSIS — D03.39 MELANOMA IN SITU OF OTHER PARTS OF FACE: ICD-10-CM

## 2021-01-14 PROCEDURE — 99072 ADDL SUPL MATRL&STAF TM PHE: CPT

## 2021-01-14 PROCEDURE — 99212 OFFICE O/P EST SF 10 MIN: CPT

## 2021-01-15 PROBLEM — D03.39 MELANOMA IN SITU OF NOSE: Status: ACTIVE | Noted: 2020-06-06

## 2021-02-08 ENCOUNTER — APPOINTMENT (OUTPATIENT)
Dept: PLASTIC SURGERY | Facility: CLINIC | Age: 66
End: 2021-02-08

## 2021-09-14 ENCOUNTER — APPOINTMENT (OUTPATIENT)
Dept: BREAST CENTER | Facility: CLINIC | Age: 66
End: 2021-09-14

## 2021-10-13 ENCOUNTER — NON-APPOINTMENT (OUTPATIENT)
Age: 66
End: 2021-10-13

## 2021-10-13 ENCOUNTER — APPOINTMENT (OUTPATIENT)
Dept: OPHTHALMOLOGY | Facility: CLINIC | Age: 66
End: 2021-10-13
Payer: COMMERCIAL

## 2021-10-13 PROCEDURE — 92015 DETERMINE REFRACTIVE STATE: CPT

## 2021-10-13 PROCEDURE — 92004 COMPRE OPH EXAM NEW PT 1/>: CPT

## 2021-11-04 ENCOUNTER — APPOINTMENT (OUTPATIENT)
Dept: BREAST CENTER | Facility: CLINIC | Age: 66
End: 2021-11-04
Payer: COMMERCIAL

## 2021-11-04 VITALS
BODY MASS INDEX: 25.11 KG/M2 | DIASTOLIC BLOOD PRESSURE: 68 MMHG | WEIGHT: 133 LBS | HEIGHT: 61 IN | SYSTOLIC BLOOD PRESSURE: 149 MMHG

## 2021-11-04 DIAGNOSIS — Z80.52 FAMILY HISTORY OF MALIGNANT NEOPLASM OF BLADDER: ICD-10-CM

## 2021-11-04 DIAGNOSIS — R92.8 OTHER ABNORMAL AND INCONCLUSIVE FINDINGS ON DIAGNOSTIC IMAGING OF BREAST: ICD-10-CM

## 2021-11-04 DIAGNOSIS — M35.00 SICCA SYNDROME, UNSPECIFIED: ICD-10-CM

## 2021-11-04 DIAGNOSIS — Z86.19 PERSONAL HISTORY OF OTHER INFECTIOUS AND PARASITIC DISEASES: ICD-10-CM

## 2021-11-04 DIAGNOSIS — R92.2 INCONCLUSIVE MAMMOGRAM: ICD-10-CM

## 2021-11-04 DIAGNOSIS — Z12.39 ENCOUNTER FOR OTHER SCREENING FOR MALIGNANT NEOPLASM OF BREAST: ICD-10-CM

## 2021-11-04 PROCEDURE — 99214 OFFICE O/P EST MOD 30 MIN: CPT

## 2021-11-04 PROCEDURE — 99204 OFFICE O/P NEW MOD 45 MIN: CPT

## 2021-11-04 RX ORDER — SERTRALINE HYDROCHLORIDE 50 MG/1
50 TABLET, FILM COATED ORAL
Refills: 0 | Status: DISCONTINUED | COMMUNITY
End: 2021-11-04

## 2021-11-04 NOTE — DATA REVIEWED
[FreeTextEntry1] : Mammogram:  1/3/18          Findings: No suspicious findings.\par \par Breast ultrasound:  1/3/18   Findings:  Right breast - NO suspicious findings. 0.4 cm nodule in R breast 6:00 N2. 0.5 cm nodule in R breast 8:00 N3.  Left breast - IN the area of palpable concern at 10:00 N7 there is a 1.3 cm mixed echoic nodule c/w a hematoma.  AT 9:00 N5 there is a 1.1 cm echoic nodule c/w a resolving hematoma. BIRADS 3. 6-8 week L US\par

## 2021-11-04 NOTE — CONSULT LETTER
[Dear  ___] : Dear  [unfilled], [Consult Letter:] : I had the pleasure of evaluating your patient, [unfilled]. [Please see my note below.] : Please see my note below. [Consult Closing:] : Thank you very much for allowing me to participate in the care of this patient.  If you have any questions, please do not hesitate to contact me. [Sincerely,] : Sincerely, [FreeTextEntry3] : Joselyn Mcgovern MD FACS

## 2021-11-04 NOTE — PHYSICAL EXAM
[Normocephalic] : normocephalic [Atraumatic] : atraumatic [Sclera nonicteric] : sclera nonicteric [Supple] : supple [No Supraclavicular Adenopathy] : no supraclavicular adenopathy [No Cervical Adenopathy] : no cervical adenopathy [Normal Sinus Rhythm] : normal sinus rhythm [No Rubs] : no pericardial rub [Examined in the supine and seated position] : examined in the supine and seated position [Symmetrical] : symmetrical [Grade 2] : Ptosis Grade 2 [No dominant masses] : no dominant masses in right breast  [No Nipple Retraction] : no left nipple retraction [No Nipple Discharge] : no left nipple discharge [No Axillary Lymphadenopathy] : no left axillary lymphadenopathy [No Edema] : no edema [No Rashes] : no rashes [No Ulceration] : no ulceration [EOMI] : extra ocular movement intact [Clear to Auscultation Bilat] : clear to auscultation bilaterally [No dominant masses] : no dominant masses left breast [Soft] : abdomen soft [Not Tender] : non-tender [de-identified] : Transverse scar in upper chest from melanoma excision

## 2022-01-04 ENCOUNTER — APPOINTMENT (OUTPATIENT)
Dept: HEMATOLOGY ONCOLOGY | Facility: CLINIC | Age: 67
End: 2022-01-04

## 2022-01-05 ENCOUNTER — LABORATORY RESULT (OUTPATIENT)
Age: 67
End: 2022-01-05

## 2022-05-06 ENCOUNTER — APPOINTMENT (OUTPATIENT)
Dept: OTOLARYNGOLOGY | Facility: CLINIC | Age: 67
End: 2022-05-06
Payer: COMMERCIAL

## 2022-05-06 VITALS
BODY MASS INDEX: 25.11 KG/M2 | WEIGHT: 133 LBS | HEIGHT: 61 IN | DIASTOLIC BLOOD PRESSURE: 73 MMHG | SYSTOLIC BLOOD PRESSURE: 121 MMHG | HEART RATE: 58 BPM

## 2022-05-06 DIAGNOSIS — H90.3 SENSORINEURAL HEARING LOSS, BILATERAL: ICD-10-CM

## 2022-05-06 DIAGNOSIS — H93.293 OTHER ABNORMAL AUDITORY PERCEPTIONS, BILATERAL: ICD-10-CM

## 2022-05-06 DIAGNOSIS — H69.83 OTHER SPECIFIED DISORDERS OF EUSTACHIAN TUBE, BILATERAL: ICD-10-CM

## 2022-05-06 PROCEDURE — 92550 TYMPANOMETRY & REFLEX THRESH: CPT

## 2022-05-06 PROCEDURE — 99203 OFFICE O/P NEW LOW 30 MIN: CPT

## 2022-05-06 PROCEDURE — 92557 COMPREHENSIVE HEARING TEST: CPT

## 2022-05-06 RX ORDER — FLUTICASONE PROPIONATE 50 UG/1
50 SPRAY, METERED NASAL DAILY
Qty: 1 | Refills: 2 | Status: ACTIVE | COMMUNITY
Start: 2022-05-06 | End: 1900-01-01

## 2022-05-06 NOTE — HISTORY OF PRESENT ILLNESS
[de-identified] : Belgica Luong is a 68 yo female who presents for hearing evaluation. She notes a history of wearing hearing aid in the left ear only but she stopped wearing it years ago. She feels that her hearing has worsened over time in both ears. She notes bilateral constant nonpulsatile tinnitus. She denies vertigo. She denies otalgia, otorrhea. She had recurrent ear infections as a child but has had a few as an adult. She did not have ear tubes as a child. She denies facial weakness or numbness. She denies fevers. She denies family history of early onset hearing loss or occupational noise exposure. She denies exposure to ototoxic medications. She denies head trauma.

## 2022-05-06 NOTE — ASSESSMENT
[FreeTextEntry1] : Belgica Luong presents for hearing evaluation. She previously wore a hearing aid but has not used it in quite some time. She is interested in new hearing aids. Audiogram shows type C tymp AD, type As/C tymp AS, normal hearing sloping to moderately severe SNHL AU. There was a slight asymmetry in WRS, with the left ear being worse. Offered MRI brain/IAC but she deferred. She notes popping sensation of her ears, which correlates with type C tymps and eustachian tube dysfunction. Will start nasal steroid spray.\par \par - Fluticasone 2 sprays daily to each nostril.\par - Hearing aid evaluation.\par - Follow up in 1 year with repeat audiogram.\par

## 2022-05-06 NOTE — REVIEW OF SYSTEMS
[Hearing Loss] : hearing loss [Recurrent Ear Infections] : recurrent ear infections [Ear Drainage] : ear drainage [Ear Noises] : ear noises [Throat Clearing] : throat clearing [Chills] : chills [Eye Pain] : eye pain [Cough] : cough [Heartburn] : heartburn [Joint Pain] : joint pain [Negative] : Heme/Lymph [de-identified] : headache  [FreeTextEntry1] : difficulty swallowing, fatigue, skin/hair changes, hives

## 2022-05-06 NOTE — DATA REVIEWED
[de-identified] : Tymps: Type C AD, type As/C AS\par Audio: -1000 Hz sloping to mod sev SNHL 9034-2277 Hz AU\par Recs: 1. ENT f/u, 2. Re-eval as per MD, 3. Consider use of binaural amplification

## 2022-05-06 NOTE — PHYSICAL EXAM
[Midline] : trachea located in midline position [Removed] : palatine tonsils previously removed [Normal] : no rashes [de-identified] : No upper teeth.

## 2022-06-27 ENCOUNTER — APPOINTMENT (OUTPATIENT)
Dept: PHARMACY | Facility: CLINIC | Age: 67
End: 2022-06-27
Payer: SELF-PAY

## 2022-06-27 PROCEDURE — V5010 ASSESSMENT FOR HEARING AID: CPT

## 2022-08-15 ENCOUNTER — APPOINTMENT (OUTPATIENT)
Dept: PHARMACY | Facility: CLINIC | Age: 67
End: 2022-08-15

## 2022-08-15 PROCEDURE — V5261A: CUSTOM

## 2022-09-09 ENCOUNTER — APPOINTMENT (OUTPATIENT)
Dept: PHARMACY | Facility: CLINIC | Age: 67
End: 2022-09-09

## 2022-09-09 PROCEDURE — V5299A: CUSTOM | Mod: NC

## 2022-09-21 ENCOUNTER — NON-APPOINTMENT (OUTPATIENT)
Age: 67
End: 2022-09-21

## 2022-09-22 ENCOUNTER — EMERGENCY (EMERGENCY)
Facility: HOSPITAL | Age: 67
LOS: 1 days | Discharge: HOME | End: 2022-09-22
Attending: EMERGENCY MEDICINE | Admitting: EMERGENCY MEDICINE
Payer: COMMERCIAL

## 2022-09-22 VITALS
DIASTOLIC BLOOD PRESSURE: 69 MMHG | HEIGHT: 61 IN | SYSTOLIC BLOOD PRESSURE: 150 MMHG | TEMPERATURE: 97 F | WEIGHT: 134.92 LBS | RESPIRATION RATE: 16 BRPM | HEART RATE: 69 BPM | OXYGEN SATURATION: 100 %

## 2022-09-22 DIAGNOSIS — Z98.890 OTHER SPECIFIED POSTPROCEDURAL STATES: Chronic | ICD-10-CM

## 2022-09-22 DIAGNOSIS — Z90.49 ACQUIRED ABSENCE OF OTHER SPECIFIED PARTS OF DIGESTIVE TRACT: Chronic | ICD-10-CM

## 2022-09-22 LAB
ALBUMIN SERPL ELPH-MCNC: 3.5 G/DL — SIGNIFICANT CHANGE UP (ref 3.3–5)
ALP SERPL-CCNC: 82 U/L — SIGNIFICANT CHANGE UP (ref 40–120)
ALT FLD-CCNC: 16 U/L — SIGNIFICANT CHANGE UP (ref 12–78)
ANION GAP SERPL CALC-SCNC: 4 MMOL/L — LOW (ref 5–17)
APTT BLD: 33.3 SEC — SIGNIFICANT CHANGE UP (ref 27.5–35.5)
AST SERPL-CCNC: 18 U/L — SIGNIFICANT CHANGE UP (ref 15–37)
BASOPHILS # BLD AUTO: 0.02 K/UL — SIGNIFICANT CHANGE UP (ref 0–0.2)
BASOPHILS NFR BLD AUTO: 0.6 % — SIGNIFICANT CHANGE UP (ref 0–2)
BILIRUB SERPL-MCNC: 0.4 MG/DL — SIGNIFICANT CHANGE UP (ref 0.2–1.2)
BUN SERPL-MCNC: 14 MG/DL — SIGNIFICANT CHANGE UP (ref 7–23)
CALCIUM SERPL-MCNC: 8.9 MG/DL — SIGNIFICANT CHANGE UP (ref 8.5–10.1)
CHLORIDE SERPL-SCNC: 107 MMOL/L — SIGNIFICANT CHANGE UP (ref 96–108)
CO2 SERPL-SCNC: 29 MMOL/L — SIGNIFICANT CHANGE UP (ref 22–31)
CREAT SERPL-MCNC: 0.72 MG/DL — SIGNIFICANT CHANGE UP (ref 0.5–1.3)
D DIMER BLD IA.RAPID-MCNC: 164 NG/ML DDU — SIGNIFICANT CHANGE UP
EGFR: 90 ML/MIN/1.73M2 — SIGNIFICANT CHANGE UP
EOSINOPHIL # BLD AUTO: 0.04 K/UL — SIGNIFICANT CHANGE UP (ref 0–0.5)
EOSINOPHIL NFR BLD AUTO: 1.1 % — SIGNIFICANT CHANGE UP (ref 0–6)
GLUCOSE SERPL-MCNC: 118 MG/DL — HIGH (ref 70–99)
HCT VFR BLD CALC: 33.5 % — LOW (ref 34.5–45)
HGB BLD-MCNC: 11.4 G/DL — LOW (ref 11.5–15.5)
IMM GRANULOCYTES NFR BLD AUTO: 0.3 % — SIGNIFICANT CHANGE UP (ref 0–0.9)
INR BLD: 1.08 RATIO — SIGNIFICANT CHANGE UP (ref 0.88–1.16)
LYMPHOCYTES # BLD AUTO: 1.22 K/UL — SIGNIFICANT CHANGE UP (ref 1–3.3)
LYMPHOCYTES # BLD AUTO: 34.7 % — SIGNIFICANT CHANGE UP (ref 13–44)
MCHC RBC-ENTMCNC: 32.2 PG — SIGNIFICANT CHANGE UP (ref 27–34)
MCHC RBC-ENTMCNC: 34 GM/DL — SIGNIFICANT CHANGE UP (ref 32–36)
MCV RBC AUTO: 94.6 FL — SIGNIFICANT CHANGE UP (ref 80–100)
MONOCYTES # BLD AUTO: 0.49 K/UL — SIGNIFICANT CHANGE UP (ref 0–0.9)
MONOCYTES NFR BLD AUTO: 13.9 % — SIGNIFICANT CHANGE UP (ref 2–14)
NEUTROPHILS # BLD AUTO: 1.74 K/UL — LOW (ref 1.8–7.4)
NEUTROPHILS NFR BLD AUTO: 49.4 % — SIGNIFICANT CHANGE UP (ref 43–77)
NRBC # BLD: 0 /100 WBCS — SIGNIFICANT CHANGE UP (ref 0–0)
PLATELET # BLD AUTO: 224 K/UL — SIGNIFICANT CHANGE UP (ref 150–400)
POTASSIUM SERPL-MCNC: 3.4 MMOL/L — LOW (ref 3.5–5.3)
POTASSIUM SERPL-SCNC: 3.4 MMOL/L — LOW (ref 3.5–5.3)
PROT SERPL-MCNC: 7.3 G/DL — SIGNIFICANT CHANGE UP (ref 6–8.3)
PROTHROM AB SERPL-ACNC: 12.7 SEC — SIGNIFICANT CHANGE UP (ref 10.5–13.4)
RBC # BLD: 3.54 M/UL — LOW (ref 3.8–5.2)
RBC # FLD: 12.2 % — SIGNIFICANT CHANGE UP (ref 10.3–14.5)
SODIUM SERPL-SCNC: 140 MMOL/L — SIGNIFICANT CHANGE UP (ref 135–145)
TROPONIN I, HIGH SENSITIVITY RESULT: 9.8 NG/L — SIGNIFICANT CHANGE UP
WBC # BLD: 3.52 K/UL — LOW (ref 3.8–10.5)
WBC # FLD AUTO: 3.52 K/UL — LOW (ref 3.8–10.5)

## 2022-09-22 PROCEDURE — 80053 COMPREHEN METABOLIC PANEL: CPT

## 2022-09-22 PROCEDURE — 99285 EMERGENCY DEPT VISIT HI MDM: CPT | Mod: 25

## 2022-09-22 PROCEDURE — 84484 ASSAY OF TROPONIN QUANT: CPT

## 2022-09-22 PROCEDURE — 36415 COLL VENOUS BLD VENIPUNCTURE: CPT

## 2022-09-22 PROCEDURE — 85610 PROTHROMBIN TIME: CPT

## 2022-09-22 PROCEDURE — 71045 X-RAY EXAM CHEST 1 VIEW: CPT

## 2022-09-22 PROCEDURE — 85025 COMPLETE CBC W/AUTO DIFF WBC: CPT

## 2022-09-22 PROCEDURE — 71045 X-RAY EXAM CHEST 1 VIEW: CPT | Mod: 26

## 2022-09-22 PROCEDURE — 82553 CREATINE MB FRACTION: CPT

## 2022-09-22 PROCEDURE — 99285 EMERGENCY DEPT VISIT HI MDM: CPT

## 2022-09-22 PROCEDURE — 82550 ASSAY OF CK (CPK): CPT

## 2022-09-22 PROCEDURE — 93005 ELECTROCARDIOGRAM TRACING: CPT

## 2022-09-22 PROCEDURE — 93010 ELECTROCARDIOGRAM REPORT: CPT

## 2022-09-22 PROCEDURE — 85730 THROMBOPLASTIN TIME PARTIAL: CPT

## 2022-09-22 PROCEDURE — 87637 SARSCOV2&INF A&B&RSV AMP PRB: CPT

## 2022-09-22 PROCEDURE — 85379 FIBRIN DEGRADATION QUANT: CPT

## 2022-09-22 RX ORDER — ACETAMINOPHEN 500 MG
1 TABLET ORAL
Qty: 0 | Refills: 0 | DISCHARGE

## 2022-09-22 NOTE — ED PROVIDER NOTE - PATIENT PORTAL LINK FT
You can access the FollowMyHealth Patient Portal offered by [facility] by registering at the following website: [email or web address]. By joining [employer]’s FollowMyHealth portal, you will also be able to view your health information using other applications (apps) compatible with our system.

## 2022-09-22 NOTE — ED PROVIDER NOTE - NSFOLLOWUPINSTRUCTIONS_ED_ALL_ED_FT
1) Follow-up with your Primary Medical Doctor or referred doctor. Call today / next business day for prompt follow-up.  2) Return to Emergency room for any worsening or persistent pain, weakness, fever, or any other concerning symptoms.  3) See attached instruction sheets for additional information, including information regarding signs and symptoms to look out for, reasons to seek immediate care and other important instructions.  4) Follow-up with any specialists as discussed / noted as well.       WHAT YOU NEED TO KNOW:    Shortness of breath is a feeling that you cannot get enough air when you breathe in. You may have this feeling only during activity, or all the time. Your symptoms can range from mild to severe. Shortness of breath may be a sign of a serious health condition that needs immediate care.    DISCHARGE INSTRUCTIONS:    Return to the emergency department if:   •Your signs and symptoms are the same or worse within 24 hours of treatment.       •The skin over your ribs or on your neck sinks in when you breathe.       •You feel confused or dizzy.      Contact your healthcare provider if:   •You have new or worsening symptoms.      •You have questions or concerns about your condition or care.      Medicines:   •Medicines may be used to treat the cause of your symptoms. You may need medicine to treat a bacterial infection or reduce anxiety. Other medicines may be used to open your airway, reduce swelling, or remove extra fluid. If you have a heart condition, you may need medicine to help your heart beat more strongly or regularly.      •Take your medicine as directed. Contact your healthcare provider if you think your medicine is not helping or if you have side effects. Tell your provider if you are allergic to any medicine. Keep a list of the medicines, vitamins, and herbs you take. Include the amounts, and when and why you take them. Bring the list or the pill bottles to follow-up visits. Carry your medicine list with you in case of an emergency.      Manage shortness of breath:   •Create an action plan. You and your healthcare provider can work together to create a plan for how to handle shortness of breath. The plan can include daily activities, treatment changes, and what to do if you have severe breathing problems.      •Lean forward on your elbows when you sit. This helps your lungs expand and may make it easier to breathe.      •Use pursed-lip breathing any time you feel short of breath. Breathe in through your nose and then slowly breathe out through your mouth with your lips slightly puckered. It should take you twice as long to breathe out as it did to breathe in.  Breathe in Breathe out           •Do not smoke. Nicotine and other chemicals in cigarettes and cigars can cause lung damage and make shortness of breath worse. Ask your healthcare provider for information if you currently smoke and need help to quit. E-cigarettes or smokeless tobacco still contain nicotine. Talk to your healthcare provider before you use these products.      •Reach or maintain a healthy weight. Your healthcare provider can help you create a safe weight loss plan if you are overweight.      •Exercise as directed. Exercise can help your lungs work more easily. Exercise can also help you lose weight if needed. Try to get at least 30 minutes of exercise most days of the week.      Follow up with your healthcare provider or specialist as directed: Write down your questions so you remember to ask them during your visits.

## 2022-09-22 NOTE — ED PROVIDER NOTE - OBJECTIVE STATEMENT
Patient is a 67-year-old female with past medical history of DAVID positive autoimmune disease CPK high history of A. fib status post ablation diverticulosis emphysema fibromyalgia Lyme disease Sjogren's disease SVT here for shortness of breath for 1 week constant today.  Patient feels like she cannot get a deep enough breath and feels tightness patient also having increased sneezing and chest congestion.  Patient denies any fever chills patient states having normal exercise tolerance and no hypoxia at home.  Patient states feels similar to episode which happened years ago and noted to have high inflammation markers and lungs and he had normal biopsies. Patient is a 67-year-old female with past medical history of DAVID positive autoimmune disease CPK high history of A. fib status post ablation diverticulosis emphysema fibromyalgia Lyme disease Sjogren's disease SVT here for shortness of breath for 1 week constant today.  Patient feels like she cannot get a deep enough breath and feels tightness patient also having increased sneezing and chest congestion.  Patient denies any fever chills patient states having normal exercise tolerance and no hypoxia at home.  Patient states feels similar to episode which happened years ago and noted to have high inflammation markers and lungs and he had normal biopsies.    [facility]

## 2022-09-22 NOTE — ED PROVIDER NOTE - CLINICAL SUMMARY MEDICAL DECISION MAKING FREE TEXT BOX
67-year-old female history of DAVID positive autoimmune disease A. fib status post ablation diverticulosis emphysema fibromyalgia Lyme disease Sjogren's disease SVT here for shortness of breath for 1 week with associated chest tightness.  Feels like she cannot take a deep breath.  Had a negative COVID test at urgent care today sent in for evaluation noticing a inverted T wave in her EKG.  Denies any fever or chills.  No nausea no vomiting no abdominal pain.    Physical exam: Well-appearing no acute distress clear to auscultation bilaterally irregularly irregular abdomen soft nontender nondistended no leg edema.    Labs within normal limits D-dimer troponin negative x-ray clear EKG no acute ST changes.  Patient will follow-up with her primary care doctor.

## 2022-09-23 VITALS
TEMPERATURE: 98 F | DIASTOLIC BLOOD PRESSURE: 71 MMHG | SYSTOLIC BLOOD PRESSURE: 142 MMHG | HEART RATE: 74 BPM | RESPIRATION RATE: 17 BRPM | OXYGEN SATURATION: 100 %

## 2022-09-23 LAB
CK MB BLD-MCNC: 1.7 % — SIGNIFICANT CHANGE UP (ref 0–3.5)
CK MB CFR SERPL CALC: 1.1 NG/ML — SIGNIFICANT CHANGE UP (ref 0–3.6)
CK SERPL-CCNC: 65 U/L — SIGNIFICANT CHANGE UP (ref 26–192)
FLUAV AG NPH QL: SIGNIFICANT CHANGE UP
FLUBV AG NPH QL: SIGNIFICANT CHANGE UP
RSV RNA NPH QL NAA+NON-PROBE: SIGNIFICANT CHANGE UP
SARS-COV-2 RNA SPEC QL NAA+PROBE: SIGNIFICANT CHANGE UP

## 2022-09-23 NOTE — ED ADULT NURSE NOTE - OBJECTIVE STATEMENT
patient a/o x 4 with a calm affect c/o chest congestion without chest pain or shortness of breath.  patient has no signs of cardiac or respiratory distress..  pending initial lab results.  EKG completed, pt remains on cont cardiac monitoring

## 2022-09-28 ENCOUNTER — RESULT REVIEW (OUTPATIENT)
Age: 67
End: 2022-09-28

## 2022-09-28 ENCOUNTER — APPOINTMENT (OUTPATIENT)
Dept: ULTRASOUND IMAGING | Facility: CLINIC | Age: 67
End: 2022-09-28

## 2022-09-28 ENCOUNTER — OUTPATIENT (OUTPATIENT)
Dept: OUTPATIENT SERVICES | Facility: HOSPITAL | Age: 67
LOS: 1 days | End: 2022-09-28
Payer: COMMERCIAL

## 2022-09-28 ENCOUNTER — APPOINTMENT (OUTPATIENT)
Dept: MAMMOGRAPHY | Facility: CLINIC | Age: 67
End: 2022-09-28

## 2022-09-28 DIAGNOSIS — Z98.890 OTHER SPECIFIED POSTPROCEDURAL STATES: Chronic | ICD-10-CM

## 2022-09-28 DIAGNOSIS — Z90.49 ACQUIRED ABSENCE OF OTHER SPECIFIED PARTS OF DIGESTIVE TRACT: Chronic | ICD-10-CM

## 2022-09-28 DIAGNOSIS — Z00.8 ENCOUNTER FOR OTHER GENERAL EXAMINATION: ICD-10-CM

## 2022-09-28 DIAGNOSIS — R92.2 INCONCLUSIVE MAMMOGRAM: ICD-10-CM

## 2022-09-28 PROCEDURE — 77063 BREAST TOMOSYNTHESIS BI: CPT

## 2022-09-28 PROCEDURE — 77063 BREAST TOMOSYNTHESIS BI: CPT | Mod: 26

## 2022-09-28 PROCEDURE — 77067 SCR MAMMO BI INCL CAD: CPT | Mod: 26

## 2022-09-28 PROCEDURE — 77067 SCR MAMMO BI INCL CAD: CPT

## 2022-09-28 PROCEDURE — 76641 ULTRASOUND BREAST COMPLETE: CPT | Mod: 26,50

## 2022-09-28 PROCEDURE — 76641 ULTRASOUND BREAST COMPLETE: CPT

## 2022-10-06 ENCOUNTER — NON-APPOINTMENT (OUTPATIENT)
Age: 67
End: 2022-10-06

## 2022-10-21 NOTE — ED ADULT TRIAGE NOTE - PAIN RATING/NUMBER SCALE (0-10): ACTIVITY
Patient Outreach    MSW outreach to patient to discuss meals and caregiver update from Tamela Bustos with [employer]. MSW provided information that paperwork for meals is complete and has been sent to [employer]. Tamela waiting on approval from [employer] for patient to begin receiving. MSW also provided update that they are working on paperwork for patient's caregiver to be hired under waiver services. No additional needs at this time by patient and she will call back to MSW as needed.    TAPAN MITCHELL -   Ambulatory Case Management    10/21/2022, 13:57 EDT    
4

## 2023-06-30 ENCOUNTER — NON-APPOINTMENT (OUTPATIENT)
Age: 68
End: 2023-06-30

## 2023-07-04 ENCOUNTER — NON-APPOINTMENT (OUTPATIENT)
Age: 68
End: 2023-07-04

## 2023-10-24 ENCOUNTER — APPOINTMENT (OUTPATIENT)
Dept: ORTHOPEDIC SURGERY | Facility: CLINIC | Age: 68
End: 2023-10-24

## 2023-11-10 NOTE — ASU PATIENT PROFILE, ADULT - PACKS YRS CALCULATION
Chief complaint:   Chief Complaint   Patient presents with   • Pre-Op Exam     Procedure:  Ventriculoperitoneal shunt placement.  Date:  11/17/1023.  Location:  [facility].  Provider:  Dr Ashok Oro.  Meaghan is here for preop H&P and surgical clearance at Dr Oro's written request.   Labs completed on 11/06/2023 please review.     • Medication Issue     Recent hip surgery went well and pain is improving.  He currently is alternating between hydrocodone and oxycodone prn pain.  He needs to know what medications should be stopped prior to surgery.       HISTORY OF PRESENT ILLNESS     HPI    Other significant problems:  Patient Active Problem List    Diagnosis Date Noted   • Pityriasis rosea 11/10/2023     Priority: Low     bx 10/2023      • Aneurysm of ascending aorta without rupture (CMD) 10/30/2023     Priority: Low   • Cardiac murmur 10/30/2023     Priority: Low   • Normal pressure hydrocephalus (CMD) 10/04/2023     Priority: Low   • Memory loss 04/05/2023     Priority: Low   • Cognitive changes 04/05/2023     Priority: Low   • Chronic hip pain after total replacement of right hip joint 11/10/2022     Priority: Low   • History of DVT in adulthood 06/03/2022     Priority: Low     RUE, secondary to PICC line, 2/2022     • History of osteomyelitis 01/12/2022     Priority: Low     Of jaw.  1/4/2022     • JODEE (obstructive sleep apnea) 04/14/2021     Priority: Low     Sleep study 4/2021.   JANEY Saldaña.   [facility]     • S/P hip replacement, right 01/26/2021     Priority: Low   • Melanoma in situ of torso excluding breast (CMD) 01/26/2021     Priority: Low     Back, 2019.  Another area on back 2023.     • Difficult intubation      Priority: Low     Have needed child-sized ET tube     • Major depressive disorder, single episode, mild (CMD)      Priority: Low     [facility]     • Nonrheumatic aortic valve stenosis 09/20/2017     Priority: Low     Tj, cardiology, echo  10/2018.  Last echo 9/2021 stable.     • Coronary artery disease due to calcified coronary lesion 09/15/2017     Priority: Low     Calcium score screening September 19, 2017: Score of 1390.   Dr Limon     • Generalized OA 06/01/2016     Priority: Low   • Essential hypertension 08/08/2014     Priority: Low   • Dyslipidemia 08/08/2014     Priority: Low   • History of prostate cancer 08/08/2014     Priority: Low   • Osteoporosis 08/08/2014     Priority: Low     Formerly treated at , S/p fasomax x 5 years, then prolia started. Stable bone density 7/2017   Prolia injection start  here 11/2/2017 . Last dose 11/2021. Discontinued 1/2022 due to osteomyelitis of the jaw. Bone density 4/2022 osteoporosis.   Dr Vaughan - started [facility] 8/2022           PAST MEDICAL, FAMILY AND SOCIAL HISTORY     Health Maintenance Due   Topic Date Due   • Shingles Vaccine (1 of 2) Never done   • DTaP/Tdap/Td Vaccine (1 - Tdap) 06/05/2012   • COVID-19 Vaccine (5 - 2023-24 season) 09/01/2023   • Colorectal Cancer Screen-  09/07/2023   • Traditional Medicare- Medicare Wellness Visit  11/10/2023       Patient is due for topics as listed above but is not proceeding with Immunization(s) Influenza at this time. He will have a flu shot at a later date after his surgery.  Other vaccines declined.  Other topics deferred to pcp.    Medications:  Current Outpatient Medications   Medication Sig Dispense Refill   • docusate sodium, DSS, 100 MG Cap Take 100 mg by mouth as needed.     • HYDROcodone-acetaminophen (Norco) 5-325 MG per tablet Take 0.5-1 tablets by mouth 3 times daily as needed for Pain. 30 tablet 0   • triamcinolone (ARISTOCORT) 0.1 % cream Apply to areas of rash twice daily for 2 weeks, then stop for 2 weeks and then may use as needed for flares. Do not apply to face, armpits, or groin. 80 g 0   • diphenhydrAMINE (BENADRYL) 25 MG capsule Take 25 mg by mouth every 6 hours as needed for Itching.     • fexofenadine (Allegra Allergy) 180 MG  tablet Take 1 tablet by mouth daily.     • FLUoxetine (PROzac) 40 MG capsule Take 1 capsule by mouth daily.     • aspirin (ECOTRIN) 325 MG EC tablet Take 1 tablet by mouth daily.     • cholecalciferol (VITAMIN D) 25 mcg (1,000 units) tablet Take 25 mcg by mouth daily.     • baclofen (LIORESAL) 10 MG tablet Take 1 tablet by mouth at bedtime as needed (for sleep). 30 tablet 1   • lisinopril (ZESTRIL) 20 MG tablet Take 1 tablet by mouth daily. 90 tablet 3   • rosuvastatin (CRESTOR) 10 MG tablet Take 1 tablet by mouth once daily 90 tablet 0   • acetaminophen (TYLENOL) 500 MG tablet Take 2 tablets by mouth 3 times daily.     • Multiple Vitamins-Minerals (MULTIVITAL) tablet Take 1 tablet by mouth daily.     • Calcium Carb-Cholecalciferol (CALCIUM-VITAMIN D) 500-200 MG-UNIT tablet Take 1 tablet by mouth.     • buPROPion (WELLBUTRIN SR) 150 MG 12 hr tablet Take 150 mg by mouth daily.        No current facility-administered medications for this visit.       Allergies:  ALLERGIES:   Allergen Reactions   • Ezetimibe Other (See Comments)     Affected brain processing        Past Medical  History/Surgeries:  Past Medical History:   Diagnosis Date   • Abnormal EKG 2013    [facility]   • Acute osteomyelitis of mandible 1/12/2022   • Aortic stenosis    • Arthritis    • Atherosclerotic heart disease of native coronary artery without angina pectoris    • Cervical radiculopathy    • Concussion 01/08/2020    without loss of consciousness   • Depressive disorder    • Difficult intubation     Have needed child-sized ET tube   • Essential (primary) hypertension    • Fracture of right superior pubic ramus (CMD) 01/08/2020    and inferior pubic ramus, hairline / non-displaced   • Glaucoma (increased eye pressure)    • Heart disease    • High cholesterol    • Lateral epicondylitis     right elbow   • Malignant neoplasm (CMD)    • Osteoporosis 2010   • Other specific joint derangements of right hip, not  elsewhere classified    • Primary osteoarthritis of right hip    • Prostate cancer (CMD) 2006   • Sinusitis, chronic     Allergy related   • Substance abuse (CMD)    • Ulcer        Past Surgical History:   Procedure Laterality Date   • Anes coccygectomy prim  2016    Dr Waters   • Anterior cruciate ligament repair Right          • Appendectomy     • Cervical fusion  2004   • Echo heart limited  2021    EF and Aortic valve   • Echocardiogram  2020   • Eye surgery Left     lense implant     • Hernia repair Bilateral     inguinal   • Hip arthroscopy, dx Right 10/20/2023    Right hip arthroscopic iliopsoas lengthening with acetabuloplasty, [facility] Muñoz - [facility]   • Knee arthroscopy w/ debridement Right 2014    Brett   • Lipoma resection      left upper back    • Lipoma resection  2021    posterior neck, Joseph   • Nm schuyler per rst/strs pharmacolo     • Prostatectomy  2006    ca   • Revision total hip arthroplasty Right 2021    Pablo   • Rotator cuff repair Right     had surgery prior in 1970s also   • Tendon graft Left     left forearm, from leg ? mucle and tendon   • Total hip arthroplasty Right 2020    Evelin   • Total hip arthroplasty Right 2021    Anterior Approach by Dr John Shah   • Trigger finger release Left     index[facility]   • Trigger finger release Right 2017    2nd, 3rd digit, [place], [place]       Family History:  Family History   Problem Relation Age of Onset   • Heart disease Mother         bypass   • Aneurysm Father         brain,  age 32   • Aneurysm Sister    • Kidney disease Brother 72   • Heart disease Brother    • Aneurysm Daughter    • Other Son         narrow airway   • Aneurysm Paternal Aunt         aortic,  age 74   • Aneurysm Paternal Uncle         brain,  age 34   • Aneurysm Paternal Uncle         aortic,  age 72       Social History:  Social History     Tobacco Use   • Smoking status: Former     Current  packs/day: 0.00     Average packs/day: 1 pack/day for 30.0 years (30.0 ttl pk-yrs)     Types: Cigarettes     Start date: 1976     Quit date: 2006     Years since quittin.8   • Smokeless tobacco: Never   Substance Use Topics   • Alcohol use: No     Alcohol/week: 0.0 standard drinks of alcohol        SURGICAL RISK AND SCREENINGS      Family History Risks  Adverse Reaction to Anesthesia: No  Bleeding or Blood Disorder: No  Malignant Hyperthermia: No     Personal Surgical History  Anesthetic Complications: #Personal History of Anesthetic Complication see medical history     Bleeding Problems: No  Problems with Surgery: Yes - Hx of difficult intubation, needs child size ET tub.e  Erich (Obstructive Sleep Apnea) is on Problem List        Malnutrition Screening Tool Current weight 189 pounds  Have you recently lost weight without trying? No  Have you been eating poorly because of a decreased appetite? No  Score = 0, Not at Risk        Functional Capacity  Are you able to do light housework, dusting, wash dishes, climb a flight of stairs or walk up a hill without chest pain or problems breathing (>4 METS)? Yes     Revised Cardiac Risk Index  Intermediate or Higher Risk Surgery No   History of Ischemic Heart Disease or Cardiac Chest Pain Yes / no  - hx high cardiac Ca+ score   Congestive Heart Failure No; Ejection Fraction 67 (2022)   History of Stroke or TIA No   Last Creatinine >2 No; 1.24 g/dL (2023)   Prescribed Insulin No   Estimated Risk of a Major Cardiac Complication 0 risk factors = 3.9% vs   1 = 6%      Acute Kidney Injury Prevention:  NORMAN Risk is Medium based on criteria below:  Atherosclerotic Heart Disease  Age >65  Plan  - 24 hours before surgery hold ACE-I/ARB, Diuretics and Potassium  - 5 days before surgery hold NSAIDs     Advance care planning documents on file - yes       REVIEW OF SYSTEMS     Review of Systems   Constitutional: Negative for appetite change and fatigue.   HENT:  Negative for congestion, sinus pressure and sinus pain.    Eyes: Negative for discharge and itching.   Respiratory: Negative for cough and shortness of breath.    Cardiovascular: Negative for chest pain and palpitations.   Gastrointestinal: Negative for abdominal pain, constipation and diarrhea.   Endocrine: Negative for cold intolerance and heat intolerance.   Genitourinary: Negative for difficulty urinating, dysuria and urgency.   Musculoskeletal: Positive for arthralgias and gait problem. Negative for myalgias.   Skin: Positive for rash (resolving, dx pityriasis rosea). Negative for wound.   Allergic/Immunologic: Negative for environmental allergies and food allergies.   Neurological: Negative for light-headedness and headaches.   Hematological: Does not bruise/bleed easily.   Psychiatric/Behavioral: Negative for dysphoric mood. The patient is not nervous/anxious.        PHYSICAL EXAM   Blood pressure 136/80, pulse 67, height 5' 6\" (1.676 m), weight 86.3 kg (190 lb 3.2 oz), SpO2 98 %.    Physical Exam  Constitutional:       Appearance: Normal appearance. He is well-developed.   HENT:      Head: Normocephalic and atraumatic.      Right Ear: Tympanic membrane, ear canal and external ear normal.      Left Ear: Tympanic membrane, ear canal and external ear normal.      Nose: Nose normal.      Mouth/Throat:      Mouth: Mucous membranes are moist.      Dentition: Normal dentition.      Pharynx: No posterior oropharyngeal erythema.      Comments: Upper and lower dentures     Neck: Normal range of motion.   Eyes:      General: Lids are normal.      Conjunctiva/sclera: Conjunctivae normal.      Right eye: Right conjunctiva is not injected.      Left eye: Left conjunctiva is not injected.      Pupils: Pupils are equal, round, and reactive to light.   Neck:      Thyroid: No thyroid mass or thyromegaly.      Vascular: No carotid bruit or JVD.      Trachea: Trachea normal.   Cardiovascular:      Rate and Rhythm: Normal rate  and regular rhythm.      Heart sounds: Normal heart sounds, S1 normal and S2 normal. No murmur heard.  Pulmonary:      Effort: Pulmonary effort is normal. No respiratory distress.      Breath sounds: Normal breath sounds. No wheezing or rales.   Abdominal:      General: Bowel sounds are normal.      Palpations: Abdomen is soft. There is no mass.      Tenderness: There is no abdominal tenderness.      Hernia: No hernia is present.   Musculoskeletal:      Comments: BL LE nontender, no edema.   Lymphadenopathy:      Cervical: No cervical adenopathy.   Skin:     General: Skin is warm and dry.      Findings: No abrasion or rash.   Neurological:      General: No focal deficit present.      Mental Status: He is alert and oriented to person, place, and time. Mental status is at baseline.      Cranial Nerves: No cranial nerve deficit.      Comments: Using cane   Psychiatric:         Mood and Affect: Mood normal. Mood is not anxious or depressed.         Speech: Speech normal.         Behavior: Behavior normal.         Thought Content: Thought content normal.         Judgment: Judgment normal.          Will discuss with patient at upcoming appointment:  CMP NL x non fasting glucose 110, alk phos 180.  CBC okay, h/h 12.7/38.3.   UA NL.  PSA 0.04.   Chol at goal.   Vit D 43.1.     10/2/2023 EKG  Normal sinus rhythm rate 65  Normal ECG   When compared with ECG of   24-OCT-2022 09:42,   No significant change was found   Confirmed by AVILA SALTER MD ([id number]) on 10/3/2023 8:06:48 AM     EXAM: XR CHEST PA AND LATERAL 2 VIEWS  DATE OF EXAM: 10/2/2023 3:42 PM.  COMPARISON: 9/8/2021.     CLINICAL INFORMATION: Atherosclerotic heart disease of native coronary  artery without angina pectoris, Coronary atherosclerosis due to calcified  coronary lesion.     FINDINGS: The heart is normal in size, as is the pulmonary vasculature. The  lungs are clear. There is no evidence of failure or infiltrate. Hilar and  mediastinal contours are normal.  There are no effusions.   IMPRESSION: Negative.     Electronically Signed by: Sourav Angulo MD  Signed on: 10/11/2023 3:53 PM    ASSESSMENT/PLAN     1. Preop examination  2. Normal pressure hydrocephalus (CMD)    Meaghan is cleared for surgery.      3. Hard to intubate, subsequent encounter  Needs pediatric ET tube.      4. Elevated glucose  - Glycohemoglobin;   5.6     5. Essential hypertension  cpm    6. Dyslipidemia  cpm    7. Generalized OA  stable    8. History of prostate cancer  No evidence of recurrence.     9. Chronic hip pain after total replacement of right hip joint  Improving after recent arthroscopy.    Pt to stop oxycodone and work towards weaning hydrocodone, discussed.     10. Need for vaccination  - Influenza Quadrivalent Enhanced High Dose Split Pres Free 0.7 mL Pre-filled Vacc (Fluzone High Dose Quad; ages 65+ yrs) - [id number]    11. Pityriasis rosea  Resolving.     Return in about 2 months (around 1/10/2024) for Medicare Wellness .    Luzma Lora MD    Copy to KIRK Oro    38

## 2023-12-28 NOTE — HISTORY OF PRESENT ILLNESS
[FreeTextEntry1] : Ms. Luong is a 66 year old woman who presents for a consultation for abnormal findings on her breast imaging from 2018. She is asymptomatic. No palpable breast or axillary lumps, nipple discharge, or skin changes. \par \par Her family history is not significant for any breast cancer. 
98

## 2023-12-30 ENCOUNTER — NON-APPOINTMENT (OUTPATIENT)
Age: 68
End: 2023-12-30

## 2024-07-01 ENCOUNTER — APPOINTMENT (OUTPATIENT)
Dept: ORTHOPEDIC SURGERY | Facility: CLINIC | Age: 69
End: 2024-07-01
Payer: COMMERCIAL

## 2024-07-01 VITALS — HEIGHT: 61 IN | BODY MASS INDEX: 26.43 KG/M2 | WEIGHT: 140 LBS

## 2024-07-01 DIAGNOSIS — M62.838 OTHER MUSCLE SPASM: ICD-10-CM

## 2024-07-01 DIAGNOSIS — M41.9 SCOLIOSIS, UNSPECIFIED: ICD-10-CM

## 2024-07-01 PROCEDURE — 72040 X-RAY EXAM NECK SPINE 2-3 VW: CPT

## 2024-07-01 PROCEDURE — 99204 OFFICE O/P NEW MOD 45 MIN: CPT

## 2024-07-01 PROCEDURE — 72100 X-RAY EXAM L-S SPINE 2/3 VWS: CPT

## 2024-07-01 RX ORDER — MELOXICAM 15 MG/1
15 TABLET ORAL DAILY
Qty: 21 | Refills: 0 | Status: ACTIVE | COMMUNITY
Start: 2024-07-01 | End: 1900-01-01

## 2024-07-01 RX ORDER — CYCLOBENZAPRINE HYDROCHLORIDE 5 MG/1
5 TABLET, FILM COATED ORAL
Qty: 21 | Refills: 0 | Status: ACTIVE | COMMUNITY
Start: 2024-07-01 | End: 1900-01-01

## 2024-08-12 ENCOUNTER — APPOINTMENT (OUTPATIENT)
Dept: ORTHOPEDIC SURGERY | Facility: CLINIC | Age: 69
End: 2024-08-12

## 2024-09-09 ENCOUNTER — APPOINTMENT (OUTPATIENT)
Dept: BREAST CENTER | Facility: CLINIC | Age: 69
End: 2024-09-09
Payer: COMMERCIAL

## 2024-09-12 ENCOUNTER — APPOINTMENT (OUTPATIENT)
Dept: BREAST CENTER | Facility: CLINIC | Age: 69
End: 2024-09-12
Payer: COMMERCIAL

## 2024-09-12 VITALS
BODY MASS INDEX: 26.43 KG/M2 | DIASTOLIC BLOOD PRESSURE: 76 MMHG | SYSTOLIC BLOOD PRESSURE: 129 MMHG | HEART RATE: 62 BPM | WEIGHT: 140 LBS | HEIGHT: 61 IN

## 2024-09-12 DIAGNOSIS — Z12.39 ENCOUNTER FOR OTHER SCREENING FOR MALIGNANT NEOPLASM OF BREAST: ICD-10-CM

## 2024-09-12 DIAGNOSIS — R92.30 DENSE BREASTS, UNSPECIFIED: ICD-10-CM

## 2024-09-12 PROCEDURE — 99203 OFFICE O/P NEW LOW 30 MIN: CPT

## 2024-09-12 NOTE — HISTORY OF PRESENT ILLNESS
[FreeTextEntry1] : Ms. Luong is a 69 year old woman who presents for a follow-up for her breast screening. She has no breast symptoms. No palpable breast or axillary lumps, nipple discharge, or skin changes. She has not yet met with a gynecologist yet for her routine gyn care.  Her family history is not significant for any breast cancer.

## 2024-09-12 NOTE — DATA REVIEWED
[FreeTextEntry1] : I have independently reviewed the reports and the images.   B/l mammogram and US 9/28/22 - scattered fibroglandular density  - BIRADS 1

## 2024-09-12 NOTE — PHYSICAL EXAM
[Normocephalic] : normocephalic [Atraumatic] : atraumatic [Sclera nonicteric] : sclera nonicteric [Supple] : supple [No Supraclavicular Adenopathy] : no supraclavicular adenopathy [No Cervical Adenopathy] : no cervical adenopathy [Clear to Auscultation Bilat] : clear to auscultation bilaterally [Normal Sinus Rhythm] : normal sinus rhythm [No Rubs] : no pericardial rub [Examined in the supine and seated position] : examined in the supine and seated position [Symmetrical] : symmetrical [No dominant masses] : no dominant masses in right breast  [No dominant masses] : no dominant masses left breast [No Nipple Retraction] : no left nipple retraction [No Nipple Discharge] : no left nipple discharge [No Axillary Lymphadenopathy] : no left axillary lymphadenopathy [No Edema] : no edema [No Rashes] : no rashes [No Ulceration] : no ulceration [de-identified] : Transverse scar in upper chest from melanoma excision

## 2024-09-12 NOTE — ASSESSMENT
[FreeTextEntry1] : Ms Luong is a 69 year old woman here for her breast care. Her breast exam today is without suspicious findings. A mammogram is ordered. She prefers to have her breast care here, and I would like to see her back for a follow-up in 1 year. Referrals to the Gynecologist as well as a Dermatologist for her history of melanoma are provided. She understands and is comfortable with the plan. She is encouraged to call if any questions or concerns arise.

## 2024-10-18 ENCOUNTER — APPOINTMENT (OUTPATIENT)
Dept: MAMMOGRAPHY | Facility: CLINIC | Age: 69
End: 2024-10-18
Payer: COMMERCIAL

## 2024-10-18 ENCOUNTER — APPOINTMENT (OUTPATIENT)
Dept: ULTRASOUND IMAGING | Facility: CLINIC | Age: 69
End: 2024-10-18
Payer: COMMERCIAL

## 2024-10-18 ENCOUNTER — RESULT REVIEW (OUTPATIENT)
Age: 69
End: 2024-10-18

## 2024-10-18 ENCOUNTER — OUTPATIENT (OUTPATIENT)
Dept: OUTPATIENT SERVICES | Facility: HOSPITAL | Age: 69
LOS: 1 days | End: 2024-10-18
Payer: COMMERCIAL

## 2024-10-18 DIAGNOSIS — Z90.49 ACQUIRED ABSENCE OF OTHER SPECIFIED PARTS OF DIGESTIVE TRACT: Chronic | ICD-10-CM

## 2024-10-18 DIAGNOSIS — Z12.39 ENCOUNTER FOR OTHER SCREENING FOR MALIGNANT NEOPLASM OF BREAST: ICD-10-CM

## 2024-10-18 DIAGNOSIS — Z98.890 OTHER SPECIFIED POSTPROCEDURAL STATES: Chronic | ICD-10-CM

## 2024-10-18 PROCEDURE — 77067 SCR MAMMO BI INCL CAD: CPT

## 2024-10-18 PROCEDURE — 77063 BREAST TOMOSYNTHESIS BI: CPT | Mod: 26

## 2024-10-18 PROCEDURE — 77063 BREAST TOMOSYNTHESIS BI: CPT

## 2024-10-18 PROCEDURE — 77067 SCR MAMMO BI INCL CAD: CPT | Mod: 26

## 2024-10-24 ENCOUNTER — NON-APPOINTMENT (OUTPATIENT)
Age: 69
End: 2024-10-24

## 2024-11-11 NOTE — PRE-ANESTHESIA EVALUATION ADULT - NSANTHRISKNONERD_GEN_ALL_CORE
Patient's wife called and will upload the EKG results from [facility] in the patient portal, if something else is needed please let  know, please advise    
No risk alerts present

## 2024-11-18 ENCOUNTER — APPOINTMENT (OUTPATIENT)
Dept: RHEUMATOLOGY | Facility: CLINIC | Age: 69
End: 2024-11-18
Payer: COMMERCIAL

## 2024-11-18 ENCOUNTER — LABORATORY RESULT (OUTPATIENT)
Age: 69
End: 2024-11-18

## 2024-11-18 VITALS
TEMPERATURE: 97.5 F | WEIGHT: 142 LBS | OXYGEN SATURATION: 97 % | HEART RATE: 71 BPM | HEIGHT: 61 IN | BODY MASS INDEX: 26.81 KG/M2 | DIASTOLIC BLOOD PRESSURE: 75 MMHG | SYSTOLIC BLOOD PRESSURE: 126 MMHG

## 2024-11-18 DIAGNOSIS — R74.8 ABNORMAL LEVELS OF OTHER SERUM ENZYMES: ICD-10-CM

## 2024-11-18 DIAGNOSIS — H16.203 UNSPECIFIED KERATOCONJUNCTIVITIS, BILATERAL: ICD-10-CM

## 2024-11-18 DIAGNOSIS — M35.9 SYSTEMIC INVOLVEMENT OF CONNECTIVE TISSUE, UNSPECIFIED: ICD-10-CM

## 2024-11-18 DIAGNOSIS — R76.8 OTHER SPECIFIED ABNORMAL IMMUNOLOGICAL FINDINGS IN SERUM: ICD-10-CM

## 2024-11-18 PROCEDURE — 99205 OFFICE O/P NEW HI 60 MIN: CPT

## 2024-11-18 PROCEDURE — 36415 COLL VENOUS BLD VENIPUNCTURE: CPT

## 2024-11-18 RX ORDER — METHYLPREDNISOLONE 4 MG/1
4 TABLET ORAL
Qty: 1 | Refills: 0 | Status: ACTIVE | COMMUNITY
Start: 2024-11-18 | End: 1900-01-01

## 2024-11-21 PROBLEM — H16.203 KERATOCONJUNCTIVITIS OF BOTH EYES: Status: ACTIVE | Noted: 2024-11-21

## 2024-11-21 LAB
ALBUMIN SERPL ELPH-MCNC: 4.2 G/DL
ALDOLASE SERPL-CCNC: 3.8 U/L
ALP BLD-CCNC: 100 U/L
ALT SERPL-CCNC: 12 U/L
ANA PAT FLD IF-IMP: ABNORMAL
ANA SER IF-ACNC: ABNORMAL
ANION GAP SERPL CALC-SCNC: 10 MMOL/L
APPEARANCE: ABNORMAL
AST SERPL-CCNC: 17 U/L
BASOPHILS # BLD AUTO: 0.03 K/UL
BASOPHILS NFR BLD AUTO: 0.8 %
BILIRUB SERPL-MCNC: 0.4 MG/DL
BILIRUBIN URINE: NEGATIVE
BLOOD URINE: NEGATIVE
BUN SERPL-MCNC: 20 MG/DL
C3 SERPL-MCNC: 113 MG/DL
C4 SERPL-MCNC: 20 MG/DL
CALCIUM SERPL-MCNC: 9.6 MG/DL
CH50 SERPL-MCNC: 81 U/ML
CHLORIDE SERPL-SCNC: 106 MMOL/L
CK SERPL-CCNC: 62 U/L
CO2 SERPL-SCNC: 25 MMOL/L
COLOR: YELLOW
CREAT SERPL-MCNC: 0.77 MG/DL
CREAT SPEC-SCNC: 146 MG/DL
CREAT/PROT UR: 0.1 RATIO
CRP SERPL-MCNC: <3 MG/L
DSDNA AB SER-ACNC: <1 IU/ML
EGFR: 82 ML/MIN/1.73M2
ENA RNP AB SER IA-ACNC: <0.2 AL
ENA SM AB SER IA-ACNC: <0.2 AL
ENA SS-A AB SER IA-ACNC: 1.3 AL
ENA SS-B AB SER IA-ACNC: <0.2 AL
EOSINOPHIL # BLD AUTO: 0.04 K/UL
EOSINOPHIL NFR BLD AUTO: 1.1 %
ERYTHROCYTE [SEDIMENTATION RATE] IN BLOOD BY WESTERGREN METHOD: 31 MM/HR
GLUCOSE QUALITATIVE U: NEGATIVE MG/DL
HCT VFR BLD CALC: 35.2 %
HGB BLD-MCNC: 12 G/DL
IGG4 SER-MCNC: 55 MG/DL
IMM GRANULOCYTES NFR BLD AUTO: 0 %
KETONES URINE: NEGATIVE MG/DL
LEUKOCYTE ESTERASE URINE: NEGATIVE
LYMPHOCYTES # BLD AUTO: 1.13 K/UL
LYMPHOCYTES NFR BLD AUTO: 31 %
M TB IFN-G BLD-IMP: NEGATIVE
MAN DIFF?: NORMAL
MCHC RBC-ENTMCNC: 32.3 PG
MCHC RBC-ENTMCNC: 34.1 G/DL
MCV RBC AUTO: 94.6 FL
MONOCYTES # BLD AUTO: 0.45 K/UL
MONOCYTES NFR BLD AUTO: 12.4 %
MYOGLOBIN SERPL-MCNC: 33 NG/ML
NEUTROPHILS # BLD AUTO: 1.99 K/UL
NEUTROPHILS NFR BLD AUTO: 54.7 %
NITRITE URINE: NEGATIVE
PH URINE: 5.5
PLATELET # BLD AUTO: 236 K/UL
POTASSIUM SERPL-SCNC: 4.4 MMOL/L
PROT SERPL-MCNC: 6.9 G/DL
PROT UR-MCNC: 7 MG/DL
PROTEIN URINE: NEGATIVE MG/DL
QUANTIFERON TB PLUS MITOGEN MINUS NIL: 2.86 IU/ML
QUANTIFERON TB PLUS NIL: 0.02 IU/ML
QUANTIFERON TB PLUS TB1 MINUS NIL: 0 IU/ML
QUANTIFERON TB PLUS TB2 MINUS NIL: 0 IU/ML
RBC # BLD: 3.72 M/UL
RBC # FLD: 12.6 %
RHEUMATOID FACT SER QL: <10 IU/ML
SODIUM SERPL-SCNC: 141 MMOL/L
SPECIFIC GRAVITY URINE: 1.02
TSH SERPL-ACNC: 1.71 UIU/ML
UROBILINOGEN URINE: 0.2 MG/DL
WBC # FLD AUTO: 3.64 K/UL

## 2024-11-22 LAB — G6PD SER-CCNC: 16.1 U/G HGB

## 2024-11-25 LAB
ALBUMIN MFR SERPL ELPH: 56.4 %
ALBUMIN SERPL-MCNC: 4.1 G/DL
ALBUMIN/GLOB SERPL: 1.3 RATIO
ALPHA1 GLOB MFR SERPL ELPH: 4.2 %
ALPHA1 GLOB SERPL ELPH-MCNC: 0.3 G/DL
ALPHA2 GLOB MFR SERPL ELPH: 10.3 %
ALPHA2 GLOB SERPL ELPH-MCNC: 0.7 G/DL
ANTI-BETA2 GLYCOPROTEIN 1 IGG CONCENTRATION: >459 U/ML
ANTI-BETA2 GLYCOPROTEIN 1 IGM CONCENTRATION: 6.3 U/ML
ANTI-CARDIOLIPIN IGG CONCENTRATION: 26 GPL
ANTI-CARDIOLIPIN IGM CONCENTRATION: 23 MPL
ANTI-CENP IGG CONCENTRATION: <0.4 U/ML
ANTI-CYCLIC CITRULLINATED PEPTIDE IGG CONCENTRATION: 1.4 U/ML
ANTI-DOUBLE-STRANDED DNA IGG CONCENTRATION: 35.16 IU/ML
ANTI-JO-1 IGG CONCENTRATION: <0.3 U/ML
ANTI-NUCLEAR ANTIBODIES - CYTOPLASMIC PATTERN: NORMAL
ANTI-NUCLEAR ANTIBODIES - PRIMARY NUCLEAR PATTERN: NORMAL
ANTI-NUCLEAR ANTIBODIES - PRIMARY PATTERN TITER: ABNORMAL
ANTI-NUCLEAR ANTIBODIES IGG CONCENTRATION: 117.55 UNITS
ANTI-RNA POL III IGG CONCENTRATION: <0.7 U/ML
ANTI-RNP70 IGG CONCENTRATION: 2 U/ML
ANTI-RO52 IGG CONCENTRATION: 40 U/ML
ANTI-RO60 IGG CONCENTRATION: <0.4 U/ML
ANTI-SCL-70 IGG CONCENTRATION: <0.6 U/ML
ANTI-SMITH IGG CONCENTRATION: 0.7 U/ML
ANTI-SS-B (LA) IGG CONCENTRATION: <0.4 U/ML
ANTI-THYROGLOBULIN IGG CONCENTRATION: 17 IU/ML
ANTI-THYROID PEROXIDASE IGG CONCENTRATION: 4.9 IU/ML
ANTI-U1RNP IGG CONCENTRATION: 1.6 U/ML
AVISE LUPUS INDEX: 2
AVISE LUPUS RESULT: POSITIVE
B-GLOBULIN MFR SERPL ELPH: 11.8 %
B-GLOBULIN SERPL ELPH-MCNC: 0.8 G/DL
B-LYMPHOCYTE-BOUND C4D (BC4D) LEVEL: 123
DEPRECATED KAPPA LC FREE/LAMBDA SER: 1.66 RATIO
ERYTHROCYTE-BOUND C4D (EC4D) LEVEL: 17
GAMMA GLOB FLD ELPH-MCNC: 1.2 G/DL
GAMMA GLOB MFR SERPL ELPH: 17.3 %
IGA SER QL IEP: 267 MG/DL
IGG SER QL IEP: 1212 MG/DL
IGM SER QL IEP: 144 MG/DL
INTERPRETATION SERPL IEP-IMP: NORMAL
KAPPA LC CSF-MCNC: 1.91 MG/DL
KAPPA LC SERPL-MCNC: 3.18 MG/DL
M PROTEIN SPEC IFE-MCNC: NORMAL
PROT SERPL-MCNC: 7.2 G/DL
PROT SERPL-MCNC: 7.2 G/DL
RHEUMATOID FACTOR (IGA) CONCENTRATION: 4.7 IU/ML
RHEUMATOID FACTOR (IGM) CONCENTRATION: 1.4 IU/ML

## 2024-11-27 ENCOUNTER — APPOINTMENT (OUTPATIENT)
Dept: RHEUMATOLOGY | Facility: CLINIC | Age: 69
End: 2024-11-27

## 2024-12-04 ENCOUNTER — APPOINTMENT (OUTPATIENT)
Dept: CT IMAGING | Facility: CLINIC | Age: 69
End: 2024-12-04
Payer: COMMERCIAL

## 2024-12-04 ENCOUNTER — OUTPATIENT (OUTPATIENT)
Dept: OUTPATIENT SERVICES | Facility: HOSPITAL | Age: 69
LOS: 1 days | End: 2024-12-04
Payer: COMMERCIAL

## 2024-12-04 DIAGNOSIS — Z90.49 ACQUIRED ABSENCE OF OTHER SPECIFIED PARTS OF DIGESTIVE TRACT: Chronic | ICD-10-CM

## 2024-12-04 DIAGNOSIS — Z00.8 ENCOUNTER FOR OTHER GENERAL EXAMINATION: ICD-10-CM

## 2024-12-04 DIAGNOSIS — Z98.890 OTHER SPECIFIED POSTPROCEDURAL STATES: Chronic | ICD-10-CM

## 2024-12-04 PROCEDURE — 74177 CT ABD & PELVIS W/CONTRAST: CPT | Mod: 26

## 2024-12-04 PROCEDURE — 74177 CT ABD & PELVIS W/CONTRAST: CPT

## 2024-12-06 ENCOUNTER — APPOINTMENT (OUTPATIENT)
Dept: OPHTHALMOLOGY | Facility: CLINIC | Age: 69
End: 2024-12-06

## 2024-12-12 ENCOUNTER — OUTPATIENT (OUTPATIENT)
Dept: OUTPATIENT SERVICES | Facility: HOSPITAL | Age: 69
LOS: 1 days | End: 2024-12-12
Payer: COMMERCIAL

## 2024-12-12 ENCOUNTER — APPOINTMENT (OUTPATIENT)
Dept: CT IMAGING | Facility: CLINIC | Age: 69
End: 2024-12-12

## 2024-12-12 DIAGNOSIS — I26.99 OTHER PULMONARY EMBOLISM WITHOUT ACUTE COR PULMONALE: ICD-10-CM

## 2024-12-12 DIAGNOSIS — Z90.49 ACQUIRED ABSENCE OF OTHER SPECIFIED PARTS OF DIGESTIVE TRACT: Chronic | ICD-10-CM

## 2024-12-12 DIAGNOSIS — Z98.890 OTHER SPECIFIED POSTPROCEDURAL STATES: Chronic | ICD-10-CM

## 2024-12-12 DIAGNOSIS — I26.99 OTHER PULMONARY EMBOLISM W/OUT ACUTE COR PULMONALE: ICD-10-CM

## 2024-12-12 PROCEDURE — 71275 CT ANGIOGRAPHY CHEST: CPT | Mod: 26

## 2024-12-12 PROCEDURE — 71275 CT ANGIOGRAPHY CHEST: CPT

## 2024-12-18 ENCOUNTER — APPOINTMENT (OUTPATIENT)
Dept: RHEUMATOLOGY | Facility: CLINIC | Age: 69
End: 2024-12-18
Payer: COMMERCIAL

## 2024-12-18 VITALS
WEIGHT: 143 LBS | SYSTOLIC BLOOD PRESSURE: 136 MMHG | OXYGEN SATURATION: 95 % | BODY MASS INDEX: 27 KG/M2 | DIASTOLIC BLOOD PRESSURE: 83 MMHG | HEIGHT: 61 IN | HEART RATE: 64 BPM

## 2024-12-18 DIAGNOSIS — H16.203 UNSPECIFIED KERATOCONJUNCTIVITIS, BILATERAL: ICD-10-CM

## 2024-12-18 DIAGNOSIS — Z71.9 COUNSELING, UNSPECIFIED: ICD-10-CM

## 2024-12-18 DIAGNOSIS — R76.8 OTHER SPECIFIED ABNORMAL IMMUNOLOGICAL FINDINGS IN SERUM: ICD-10-CM

## 2024-12-18 DIAGNOSIS — M35.9 SYSTEMIC INVOLVEMENT OF CONNECTIVE TISSUE, UNSPECIFIED: ICD-10-CM

## 2024-12-18 DIAGNOSIS — Z79.899 OTHER LONG TERM (CURRENT) DRUG THERAPY: ICD-10-CM

## 2024-12-18 PROCEDURE — G2211 COMPLEX E/M VISIT ADD ON: CPT

## 2024-12-18 PROCEDURE — 99215 OFFICE O/P EST HI 40 MIN: CPT

## 2024-12-20 PROBLEM — Z79.899 NEW MEDICATION ADDED: Status: ACTIVE | Noted: 2024-12-20

## 2024-12-20 PROBLEM — Z71.9 ENCOUNTER FOR EDUCATION: Status: ACTIVE | Noted: 2024-12-20

## 2024-12-20 RX ORDER — HYDROXYCHLOROQUINE SULFATE 300 MG/1
300 TABLET ORAL
Qty: 90 | Refills: 3 | Status: ACTIVE | COMMUNITY
Start: 2024-12-20 | End: 1900-01-01

## 2025-01-27 ENCOUNTER — NON-APPOINTMENT (OUTPATIENT)
Age: 70
End: 2025-01-27

## 2025-01-27 ENCOUNTER — APPOINTMENT (OUTPATIENT)
Dept: RHEUMATOLOGY | Facility: CLINIC | Age: 70
End: 2025-01-27
Payer: COMMERCIAL

## 2025-01-27 VITALS
SYSTOLIC BLOOD PRESSURE: 117 MMHG | BODY MASS INDEX: 26.43 KG/M2 | HEIGHT: 61 IN | HEART RATE: 68 BPM | OXYGEN SATURATION: 98 % | TEMPERATURE: 97.5 F | DIASTOLIC BLOOD PRESSURE: 72 MMHG | WEIGHT: 140 LBS

## 2025-01-27 DIAGNOSIS — M35.9 SYSTEMIC INVOLVEMENT OF CONNECTIVE TISSUE, UNSPECIFIED: ICD-10-CM

## 2025-01-27 DIAGNOSIS — Z51.81 ENCOUNTER FOR THERAPEUTIC DRUG LVL MONITORING: ICD-10-CM

## 2025-01-27 DIAGNOSIS — Z79.899 OTHER LONG TERM (CURRENT) DRUG THERAPY: ICD-10-CM

## 2025-01-27 DIAGNOSIS — R74.8 ABNORMAL LEVELS OF OTHER SERUM ENZYMES: ICD-10-CM

## 2025-01-27 DIAGNOSIS — M35.00 SICCA SYNDROME, UNSPECIFIED: ICD-10-CM

## 2025-01-27 DIAGNOSIS — R76.8 OTHER SPECIFIED ABNORMAL IMMUNOLOGICAL FINDINGS IN SERUM: ICD-10-CM

## 2025-01-27 DIAGNOSIS — R93.89 ABNORMAL FINDINGS ON DIAGNOSTIC IMAGING OF OTHER SPECIFIED BODY STRUCTURES: ICD-10-CM

## 2025-01-27 PROCEDURE — G2211 COMPLEX E/M VISIT ADD ON: CPT

## 2025-01-27 PROCEDURE — 99214 OFFICE O/P EST MOD 30 MIN: CPT

## 2025-01-27 PROCEDURE — 36415 COLL VENOUS BLD VENIPUNCTURE: CPT

## 2025-01-28 LAB
ALBUMIN SERPL ELPH-MCNC: 4.1 G/DL
ALP BLD-CCNC: 97 U/L
ALT SERPL-CCNC: 13 U/L
ANION GAP SERPL CALC-SCNC: 13 MMOL/L
AST SERPL-CCNC: 20 U/L
BILIRUB SERPL-MCNC: 0.4 MG/DL
BUN SERPL-MCNC: 19 MG/DL
CALCIUM SERPL-MCNC: 9.8 MG/DL
CHLORIDE SERPL-SCNC: 107 MMOL/L
CK SERPL-CCNC: 81 U/L
CO2 SERPL-SCNC: 26 MMOL/L
CREAT SERPL-MCNC: 0.75 MG/DL
CRP SERPL-MCNC: <3 MG/L
EGFR: 85 ML/MIN/1.73M2
ERYTHROCYTE [SEDIMENTATION RATE] IN BLOOD BY WESTERGREN METHOD: 14 MM/HR
HCT VFR BLD CALC: 38.1 %
HGB BLD-MCNC: 12.1 G/DL
MCHC RBC-ENTMCNC: 31.3 PG
MCHC RBC-ENTMCNC: 31.8 G/DL
MCV RBC AUTO: 98.7 FL
PLATELET # BLD AUTO: 233 K/UL
POTASSIUM SERPL-SCNC: 4.5 MMOL/L
PROT SERPL-MCNC: 6.6 G/DL
RBC # BLD: 3.86 M/UL
RBC # FLD: 12.4 %
SODIUM SERPL-SCNC: 145 MMOL/L
WBC # FLD AUTO: 3.18 K/UL

## 2025-01-30 LAB
C3 SERPL-MCNC: 126 MG/DL
C4 SERPL-MCNC: 16 MG/DL

## 2025-02-15 ENCOUNTER — NON-APPOINTMENT (OUTPATIENT)
Age: 70
End: 2025-02-15

## 2025-02-20 ENCOUNTER — APPOINTMENT (OUTPATIENT)
Dept: OTOLARYNGOLOGY | Facility: CLINIC | Age: 70
End: 2025-02-20
Payer: COMMERCIAL

## 2025-02-20 VITALS
BODY MASS INDEX: 26.43 KG/M2 | DIASTOLIC BLOOD PRESSURE: 73 MMHG | HEIGHT: 61 IN | WEIGHT: 140 LBS | SYSTOLIC BLOOD PRESSURE: 127 MMHG | HEART RATE: 68 BPM

## 2025-02-20 DIAGNOSIS — H90.3 SENSORINEURAL HEARING LOSS, BILATERAL: ICD-10-CM

## 2025-02-20 DIAGNOSIS — J34.89 OTHER SPECIFIED DISORDERS OF NOSE AND NASAL SINUSES: ICD-10-CM

## 2025-02-20 PROCEDURE — 99214 OFFICE O/P EST MOD 30 MIN: CPT

## 2025-02-20 RX ORDER — MUPIROCIN 20 MG/G
2 OINTMENT TOPICAL
Qty: 1 | Refills: 1 | Status: ACTIVE | COMMUNITY
Start: 2025-02-20 | End: 1900-01-01

## 2025-03-06 ENCOUNTER — APPOINTMENT (OUTPATIENT)
Dept: OTOLARYNGOLOGY | Facility: CLINIC | Age: 70
End: 2025-03-06
Payer: COMMERCIAL

## 2025-03-06 VITALS
SYSTOLIC BLOOD PRESSURE: 121 MMHG | DIASTOLIC BLOOD PRESSURE: 76 MMHG | HEART RATE: 64 BPM | BODY MASS INDEX: 26.43 KG/M2 | WEIGHT: 140 LBS | HEIGHT: 61 IN

## 2025-03-06 DIAGNOSIS — T48.5X5A CHRONIC RHINITIS: ICD-10-CM

## 2025-03-06 DIAGNOSIS — J31.0 CHRONIC RHINITIS: ICD-10-CM

## 2025-03-06 DIAGNOSIS — H90.3 SENSORINEURAL HEARING LOSS, BILATERAL: ICD-10-CM

## 2025-03-06 PROCEDURE — 92557 COMPREHENSIVE HEARING TEST: CPT

## 2025-03-06 PROCEDURE — 92567 TYMPANOMETRY: CPT

## 2025-03-06 PROCEDURE — 99214 OFFICE O/P EST MOD 30 MIN: CPT

## 2025-03-13 DIAGNOSIS — J32.9 CHRONIC SINUSITIS, UNSPECIFIED: ICD-10-CM

## 2025-03-13 RX ORDER — AMOXICILLIN 500 MG/1
500 TABLET, FILM COATED ORAL
Qty: 14 | Refills: 0 | Status: ACTIVE | COMMUNITY
Start: 2025-03-13 | End: 1900-01-01

## 2025-03-24 ENCOUNTER — APPOINTMENT (OUTPATIENT)
Dept: RHEUMATOLOGY | Facility: CLINIC | Age: 70
End: 2025-03-24
Payer: COMMERCIAL

## 2025-03-24 ENCOUNTER — LABORATORY RESULT (OUTPATIENT)
Age: 70
End: 2025-03-24

## 2025-03-24 VITALS
OXYGEN SATURATION: 97 % | BODY MASS INDEX: 27.19 KG/M2 | DIASTOLIC BLOOD PRESSURE: 70 MMHG | WEIGHT: 144 LBS | HEIGHT: 61 IN | SYSTOLIC BLOOD PRESSURE: 120 MMHG | HEART RATE: 78 BPM

## 2025-03-24 DIAGNOSIS — R76.8 OTHER SPECIFIED ABNORMAL IMMUNOLOGICAL FINDINGS IN SERUM: ICD-10-CM

## 2025-03-24 DIAGNOSIS — M35.9 SYSTEMIC INVOLVEMENT OF CONNECTIVE TISSUE, UNSPECIFIED: ICD-10-CM

## 2025-03-24 DIAGNOSIS — Z79.899 OTHER LONG TERM (CURRENT) DRUG THERAPY: ICD-10-CM

## 2025-03-24 DIAGNOSIS — J32.9 CHRONIC SINUSITIS, UNSPECIFIED: ICD-10-CM

## 2025-03-24 DIAGNOSIS — R21 RASH AND OTHER NONSPECIFIC SKIN ERUPTION: ICD-10-CM

## 2025-03-24 DIAGNOSIS — M35.00 SICCA SYNDROME, UNSPECIFIED: ICD-10-CM

## 2025-03-24 PROCEDURE — 99214 OFFICE O/P EST MOD 30 MIN: CPT

## 2025-03-24 PROCEDURE — G2211 COMPLEX E/M VISIT ADD ON: CPT

## 2025-03-26 DIAGNOSIS — Z79.899 OTHER LONG TERM (CURRENT) DRUG THERAPY: ICD-10-CM

## 2025-03-26 DIAGNOSIS — D72.819 DECREASED WHITE BLOOD CELL COUNT, UNSPECIFIED: ICD-10-CM

## 2025-03-26 DIAGNOSIS — Z51.81 ENCOUNTER FOR THERAPEUTIC DRUG LVL MONITORING: ICD-10-CM

## 2025-03-26 LAB
ALBUMIN SERPL ELPH-MCNC: 4.2 G/DL
ALP BLD-CCNC: 110 U/L
ALT SERPL-CCNC: 18 U/L
ANION GAP SERPL CALC-SCNC: 9 MMOL/L
AST SERPL-CCNC: 20 U/L
BASOPHILS # BLD AUTO: 0.05 K/UL
BASOPHILS NFR BLD AUTO: 2 %
BILIRUB SERPL-MCNC: 0.2 MG/DL
BUN SERPL-MCNC: 16 MG/DL
C3 SERPL-MCNC: 112 MG/DL
C4 SERPL-MCNC: 18 MG/DL
CALCIUM SERPL-MCNC: 9.7 MG/DL
CHLORIDE SERPL-SCNC: 107 MMOL/L
CO2 SERPL-SCNC: 26 MMOL/L
CREAT SERPL-MCNC: 0.71 MG/DL
CREAT SPEC-SCNC: 191 MG/DL
CREAT/PROT UR: 0.1 RATIO
CRP SERPL-MCNC: 19 MG/L
DSDNA AB SER-ACNC: <1 IU/ML
EGFRCR SERPLBLD CKD-EPI 2021: 90 ML/MIN/1.73M2
EOSINOPHIL # BLD AUTO: 0.05 K/UL
EOSINOPHIL NFR BLD AUTO: 2 %
ERYTHROCYTE [SEDIMENTATION RATE] IN BLOOD BY WESTERGREN METHOD: 37 MM/HR
HCT VFR BLD CALC: 36.5 %
HGB BLD-MCNC: 11.7 G/DL
LYMPHOCYTES # BLD AUTO: 0.93 K/UL
LYMPHOCYTES NFR BLD AUTO: 39 %
MANUAL SMEAR: NORMAL
MCHC RBC-ENTMCNC: 31.5 PG
MCHC RBC-ENTMCNC: 32.1 G/DL
MCV RBC AUTO: 98.1 FL
MONOCYTES # BLD AUTO: 0.26 K/UL
MONOCYTES NFR BLD AUTO: 11 %
NEUTROPHILS # BLD AUTO: 1.09 K/UL
NEUTROPHILS NFR BLD AUTO: 46 %
PLAT MORPH BLD: NORMAL
PLATELET # BLD AUTO: 266 K/UL
POTASSIUM SERPL-SCNC: 4.3 MMOL/L
PROT SERPL-MCNC: 6.9 G/DL
PROT UR-MCNC: 10 MG/DL
RBC # BLD: 3.72 M/UL
RBC # FLD: 12.7 %
RBC BLD AUTO: NORMAL
SODIUM SERPL-SCNC: 142 MMOL/L
THYROGLOB AB SERPL-ACNC: 17.2 IU/ML
THYROPEROXIDASE AB SERPL IA-ACNC: 20.7 IU/ML
WBC # FLD AUTO: 2.52 K/UL

## 2025-03-31 LAB
CHRONIC URTICARIA PANEL (CU INDEX): 15.2
HYDROXYCHLOROQUINE CONCENTRATION: 1290.2 NG/ML

## 2025-04-03 ENCOUNTER — APPOINTMENT (OUTPATIENT)
Dept: PHARMACY | Facility: CLINIC | Age: 70
End: 2025-04-03

## 2025-04-03 PROCEDURE — V5299A: CUSTOM

## 2025-04-04 ENCOUNTER — APPOINTMENT (OUTPATIENT)
Dept: OTOLARYNGOLOGY | Facility: CLINIC | Age: 70
End: 2025-04-04
Payer: COMMERCIAL

## 2025-04-04 VITALS
SYSTOLIC BLOOD PRESSURE: 107 MMHG | HEART RATE: 66 BPM | HEIGHT: 61 IN | BODY MASS INDEX: 26.43 KG/M2 | WEIGHT: 140 LBS | DIASTOLIC BLOOD PRESSURE: 64 MMHG

## 2025-04-04 DIAGNOSIS — H90.3 SENSORINEURAL HEARING LOSS, BILATERAL: ICD-10-CM

## 2025-04-04 DIAGNOSIS — R21 RASH AND OTHER NONSPECIFIC SKIN ERUPTION: ICD-10-CM

## 2025-04-04 DIAGNOSIS — T48.5X5A CHRONIC RHINITIS: ICD-10-CM

## 2025-04-04 DIAGNOSIS — J31.0 CHRONIC RHINITIS: ICD-10-CM

## 2025-04-04 DIAGNOSIS — J34.89 OTHER SPECIFIED DISORDERS OF NOSE AND NASAL SINUSES: ICD-10-CM

## 2025-04-04 PROCEDURE — 99213 OFFICE O/P EST LOW 20 MIN: CPT

## 2025-04-08 ENCOUNTER — APPOINTMENT (OUTPATIENT)
Dept: DERMATOLOGY | Facility: CLINIC | Age: 70
End: 2025-04-08
Payer: COMMERCIAL

## 2025-04-08 ENCOUNTER — NON-APPOINTMENT (OUTPATIENT)
Age: 70
End: 2025-04-08

## 2025-04-08 DIAGNOSIS — L82.1 OTHER SEBORRHEIC KERATOSIS: ICD-10-CM

## 2025-04-08 DIAGNOSIS — L93.2 OTHER LOCAL LUPUS ERYTHEMATOSUS: ICD-10-CM

## 2025-04-08 PROCEDURE — 99203 OFFICE O/P NEW LOW 30 MIN: CPT

## 2025-04-08 PROCEDURE — 0026D: CPT

## 2025-04-08 RX ORDER — DESONIDE 0.5 MG/G
0.05 CREAM TOPICAL TWICE DAILY
Qty: 1 | Refills: 1 | Status: ACTIVE | COMMUNITY
Start: 2025-04-08 | End: 1900-01-01

## 2025-06-06 RX ORDER — AMOXICILLIN AND CLAVULANATE POTASSIUM 875; 125 MG/1; MG/1
875-125 TABLET, COATED ORAL
Qty: 20 | Refills: 0 | Status: ACTIVE | COMMUNITY
Start: 2025-06-06 | End: 1900-01-01

## 2025-06-11 ENCOUNTER — APPOINTMENT (OUTPATIENT)
Dept: RADIOLOGY | Facility: CLINIC | Age: 70
End: 2025-06-11
Payer: COMMERCIAL

## 2025-06-11 PROCEDURE — 70220 X-RAY EXAM OF SINUSES: CPT

## 2025-06-11 PROCEDURE — 71046 X-RAY EXAM CHEST 2 VIEWS: CPT

## 2025-06-16 ENCOUNTER — APPOINTMENT (OUTPATIENT)
Dept: RHEUMATOLOGY | Facility: CLINIC | Age: 70
End: 2025-06-16
Payer: COMMERCIAL

## 2025-06-16 VITALS
BODY MASS INDEX: 26.43 KG/M2 | HEART RATE: 60 BPM | OXYGEN SATURATION: 100 % | TEMPERATURE: 97.6 F | SYSTOLIC BLOOD PRESSURE: 110 MMHG | HEIGHT: 61 IN | WEIGHT: 140 LBS | DIASTOLIC BLOOD PRESSURE: 66 MMHG

## 2025-06-16 PROCEDURE — 99214 OFFICE O/P EST MOD 30 MIN: CPT

## 2025-06-16 PROCEDURE — G2211 COMPLEX E/M VISIT ADD ON: CPT

## 2025-06-27 ENCOUNTER — APPOINTMENT (OUTPATIENT)
Dept: OPHTHALMOLOGY | Facility: CLINIC | Age: 70
End: 2025-06-27
Payer: COMMERCIAL

## 2025-06-27 ENCOUNTER — NON-APPOINTMENT (OUTPATIENT)
Age: 70
End: 2025-06-27

## 2025-06-27 PROCEDURE — 92083 EXTENDED VISUAL FIELD XM: CPT

## 2025-06-27 PROCEDURE — 92004 COMPRE OPH EXAM NEW PT 1/>: CPT

## 2025-06-27 PROCEDURE — 92134 CPTRZ OPH DX IMG PST SGM RTA: CPT

## 2025-07-08 ENCOUNTER — APPOINTMENT (OUTPATIENT)
Dept: PHARMACY | Facility: CLINIC | Age: 70
End: 2025-07-08

## 2025-07-09 ENCOUNTER — APPOINTMENT (OUTPATIENT)
Dept: DERMATOLOGY | Facility: CLINIC | Age: 70
End: 2025-07-09
Payer: COMMERCIAL

## 2025-07-09 PROBLEM — D18.01 CHERRY ANGIOMA: Status: ACTIVE | Noted: 2025-07-09

## 2025-07-09 PROBLEM — R21 RASH: Status: RESOLVED | Noted: 2025-03-24 | Resolved: 2025-07-09

## 2025-07-09 PROBLEM — D22.9 MULTIPLE BENIGN MELANOCYTIC NEVI: Status: ACTIVE | Noted: 2025-07-09

## 2025-07-09 PROBLEM — Z85.820 HISTORY OF MALIGNANT MELANOMA: Status: RESOLVED | Noted: 2025-07-09 | Resolved: 2025-07-09

## 2025-07-09 PROCEDURE — 99213 OFFICE O/P EST LOW 20 MIN: CPT

## 2025-07-18 PROBLEM — B37.2 CANDIDAL DERMATITIS: Status: ACTIVE | Noted: 2025-07-18

## 2025-07-18 RX ORDER — SILVER SULFADIAZINE 10 MG/G
1 CREAM TOPICAL TWICE DAILY
Qty: 1 | Refills: 1 | Status: ACTIVE | COMMUNITY
Start: 2025-07-18 | End: 1900-01-01

## 2025-07-24 ENCOUNTER — APPOINTMENT (OUTPATIENT)
Dept: PHARMACY | Facility: CLINIC | Age: 70
End: 2025-07-24
Payer: SELF-PAY

## 2025-07-24 PROCEDURE — V5299A: CUSTOM

## 2025-08-13 ENCOUNTER — APPOINTMENT (OUTPATIENT)
Dept: PHARMACY | Facility: CLINIC | Age: 70
End: 2025-08-13
Payer: SELF-PAY

## 2025-08-13 PROCEDURE — V5299A: CUSTOM

## 2025-09-15 ENCOUNTER — APPOINTMENT (OUTPATIENT)
Dept: RHEUMATOLOGY | Facility: CLINIC | Age: 70
End: 2025-09-15
Payer: COMMERCIAL

## 2025-09-15 ENCOUNTER — LABORATORY RESULT (OUTPATIENT)
Age: 70
End: 2025-09-15

## 2025-09-15 VITALS
OXYGEN SATURATION: 98 % | DIASTOLIC BLOOD PRESSURE: 72 MMHG | TEMPERATURE: 97.3 F | HEIGHT: 61 IN | SYSTOLIC BLOOD PRESSURE: 110 MMHG | WEIGHT: 137 LBS | BODY MASS INDEX: 25.86 KG/M2 | HEART RATE: 63 BPM

## 2025-09-15 DIAGNOSIS — Z79.899 OTHER LONG TERM (CURRENT) DRUG THERAPY: ICD-10-CM

## 2025-09-15 DIAGNOSIS — M35.9 SYSTEMIC INVOLVEMENT OF CONNECTIVE TISSUE, UNSPECIFIED: ICD-10-CM

## 2025-09-15 DIAGNOSIS — Z51.81 ENCOUNTER FOR THERAPEUTIC DRUG LVL MONITORING: ICD-10-CM

## 2025-09-15 DIAGNOSIS — R26.89 OTHER ABNORMALITIES OF GAIT AND MOBILITY: ICD-10-CM

## 2025-09-15 DIAGNOSIS — R93.89 ABNORMAL FINDINGS ON DIAGNOSTIC IMAGING OF OTHER SPECIFIED BODY STRUCTURES: ICD-10-CM

## 2025-09-15 DIAGNOSIS — M35.00 SICCA SYNDROME, UNSPECIFIED: ICD-10-CM

## 2025-09-15 DIAGNOSIS — R29.898 OTHER SYMPTOMS AND SIGNS INVOLVING THE MUSCULOSKELETAL SYSTEM: ICD-10-CM

## 2025-09-15 DIAGNOSIS — G25.81 RESTLESS LEGS SYNDROME: ICD-10-CM

## 2025-09-15 DIAGNOSIS — R76.8 OTHER SPECIFIED ABNORMAL IMMUNOLOGICAL FINDINGS IN SERUM: ICD-10-CM

## 2025-09-15 PROCEDURE — G2211 COMPLEX E/M VISIT ADD ON: CPT

## 2025-09-15 PROCEDURE — 36415 COLL VENOUS BLD VENIPUNCTURE: CPT

## 2025-09-15 PROCEDURE — 99214 OFFICE O/P EST MOD 30 MIN: CPT

## 2025-09-16 ENCOUNTER — APPOINTMENT (OUTPATIENT)
Dept: BREAST CENTER | Facility: CLINIC | Age: 70
End: 2025-09-16
Payer: COMMERCIAL

## 2025-09-16 VITALS
HEIGHT: 61 IN | WEIGHT: 137 LBS | DIASTOLIC BLOOD PRESSURE: 86 MMHG | BODY MASS INDEX: 25.86 KG/M2 | SYSTOLIC BLOOD PRESSURE: 137 MMHG | HEART RATE: 69 BPM

## 2025-09-16 DIAGNOSIS — Z12.39 ENCOUNTER FOR OTHER SCREENING FOR MALIGNANT NEOPLASM OF BREAST: ICD-10-CM

## 2025-09-16 LAB
ALBUMIN SERPL ELPH-MCNC: 4.5 G/DL
ALP BLD-CCNC: 113 U/L
ALT SERPL-CCNC: 15 U/L
ANION GAP SERPL CALC-SCNC: 13 MMOL/L
AST SERPL-CCNC: 23 U/L
BILIRUB SERPL-MCNC: 0.4 MG/DL
BUN SERPL-MCNC: 19 MG/DL
CALCIUM SERPL-MCNC: 9.7 MG/DL
CHLORIDE SERPL-SCNC: 107 MMOL/L
CO2 SERPL-SCNC: 23 MMOL/L
CREAT SERPL-MCNC: 0.73 MG/DL
CRP SERPL-MCNC: <3 MG/L
EGFRCR SERPLBLD CKD-EPI 2021: 87 ML/MIN/1.73M2
ERYTHROCYTE [SEDIMENTATION RATE] IN BLOOD BY WESTERGREN METHOD: 6 MM/HR
FERRITIN SERPL-MCNC: 85 NG/ML
POTASSIUM SERPL-SCNC: 4.7 MMOL/L
PROT SERPL-MCNC: 6.7 G/DL
RHEUMATOID FACT SERPL-ACNC: <10 IU/ML
SODIUM SERPL-SCNC: 143 MMOL/L

## 2025-09-16 PROCEDURE — 99213 OFFICE O/P EST LOW 20 MIN: CPT

## 2025-09-16 RX ORDER — VIT C/E/ZN/COPPR/LUTEIN/ZEAXAN 250MG-90MG
CAPSULE ORAL
Refills: 0 | Status: ACTIVE | COMMUNITY

## 2025-09-17 LAB
BASOPHILS # BLD AUTO: 0.04 K/UL
BASOPHILS NFR BLD AUTO: 1.4 %
C3 SERPL-MCNC: 96 MG/DL
C4 SERPL-MCNC: 16 MG/DL
CREAT SPEC-SCNC: 164 MG/DL
CREAT/PROT UR: 0.1 RATIO
ENA SS-A AB SER-ACNC: 0.9 AL
ENA SS-B AB SER-ACNC: <0.2 AL
EOSINOPHIL # BLD AUTO: 0.04 K/UL
EOSINOPHIL NFR BLD AUTO: 1.4 %
HCT VFR BLD CALC: 39.1 %
HGB BLD-MCNC: 12.4 G/DL
IMM GRANULOCYTES NFR BLD AUTO: 0 %
LYMPHOCYTES # BLD AUTO: 1.22 K/UL
LYMPHOCYTES NFR BLD AUTO: 42.2 %
MAN DIFF?: NORMAL
MCHC RBC-ENTMCNC: 31.5 PG
MCHC RBC-ENTMCNC: 31.7 G/DL
MCV RBC AUTO: 99.2 FL
MONOCYTES # BLD AUTO: 0.41 K/UL
MONOCYTES NFR BLD AUTO: 14.2 %
NEUTROPHILS # BLD AUTO: 1.18 K/UL
NEUTROPHILS NFR BLD AUTO: 40.8 %
PLATELET # BLD AUTO: 221 K/UL
PROT UR-MCNC: 9 MG/DL
RBC # BLD: 3.94 M/UL
RBC # FLD: 13 %
WBC # FLD AUTO: 2.89 K/UL

## 2025-09-18 LAB
ALBUMIN MFR SERPL ELPH: 60.7 %
ALBUMIN SERPL-MCNC: 4.1 G/DL
ALBUMIN/GLOB SERPL: 1.6 RATIO
ALPHA1 GLOB MFR SERPL ELPH: 4.2 %
ALPHA1 GLOB SERPL ELPH-MCNC: 0.3 G/DL
ALPHA2 GLOB MFR SERPL ELPH: 9.1 %
ALPHA2 GLOB SERPL ELPH-MCNC: 0.6 G/DL
B-GLOBULIN MFR SERPL ELPH: 10.4 %
B-GLOBULIN SERPL ELPH-MCNC: 0.7 G/DL
DEPRECATED KAPPA LC FREE/LAMBDA SER: 1.4 RATIO
GAMMA GLOB FLD ELPH-MCNC: 1 G/DL
GAMMA GLOB MFR SERPL ELPH: 15.6 %
IGA SERPL-MCNC: 219 MG/DL
IGG SERPL-MCNC: 1058 MG/DL
IGM SERPL-MCNC: 119 MG/DL
INTERPRETATION SERPL IEP-IMP: NORMAL
KAPPA LC CSF-MCNC: 2.1 MG/DL
KAPPA LC SERPL-MCNC: 2.94 MG/DL
M PROTEIN SPEC IFE-MCNC: NORMAL
PROT SERPL-MCNC: 6.7 G/DL
PROT SERPL-MCNC: 6.7 G/DL